# Patient Record
Sex: FEMALE | Race: WHITE | NOT HISPANIC OR LATINO | Employment: FULL TIME | URBAN - METROPOLITAN AREA
[De-identification: names, ages, dates, MRNs, and addresses within clinical notes are randomized per-mention and may not be internally consistent; named-entity substitution may affect disease eponyms.]

---

## 2018-05-02 ENCOUNTER — OFFICE VISIT (OUTPATIENT)
Dept: INTERNAL MEDICINE CLINIC | Facility: CLINIC | Age: 33
End: 2018-05-02
Payer: COMMERCIAL

## 2018-05-02 VITALS
HEART RATE: 92 BPM | OXYGEN SATURATION: 98 % | BODY MASS INDEX: 18.37 KG/M2 | HEIGHT: 64 IN | WEIGHT: 107.6 LBS | TEMPERATURE: 98.5 F | SYSTOLIC BLOOD PRESSURE: 104 MMHG | DIASTOLIC BLOOD PRESSURE: 74 MMHG

## 2018-05-02 DIAGNOSIS — F41.9 ANXIETY: ICD-10-CM

## 2018-05-02 DIAGNOSIS — M54.2 NECK PAIN: ICD-10-CM

## 2018-05-02 DIAGNOSIS — G89.29 CHRONIC RIGHT-SIDED LOW BACK PAIN WITH RIGHT-SIDED SCIATICA: ICD-10-CM

## 2018-05-02 DIAGNOSIS — G47.09 OTHER INSOMNIA: Primary | ICD-10-CM

## 2018-05-02 DIAGNOSIS — N91.2 AMENORRHEA: ICD-10-CM

## 2018-05-02 DIAGNOSIS — M54.41 CHRONIC RIGHT-SIDED LOW BACK PAIN WITH RIGHT-SIDED SCIATICA: ICD-10-CM

## 2018-05-02 DIAGNOSIS — J30.1 SEASONAL ALLERGIC RHINITIS DUE TO POLLEN: ICD-10-CM

## 2018-05-02 DIAGNOSIS — Z72.0 TOBACCO ABUSE: ICD-10-CM

## 2018-05-02 PROBLEM — G43.009 MIGRAINE WITHOUT AURA AND WITHOUT STATUS MIGRAINOSUS, NOT INTRACTABLE: Status: ACTIVE | Noted: 2018-05-02

## 2018-05-02 PROBLEM — J30.9 ALLERGIC RHINITIS: Status: ACTIVE | Noted: 2017-07-10

## 2018-05-02 PROCEDURE — 3725F SCREEN DEPRESSION PERFORMED: CPT | Performed by: INTERNAL MEDICINE

## 2018-05-02 PROCEDURE — 99203 OFFICE O/P NEW LOW 30 MIN: CPT | Performed by: INTERNAL MEDICINE

## 2018-05-02 RX ORDER — BUSPIRONE HYDROCHLORIDE 15 MG/1
15 TABLET ORAL
COMMUNITY
Start: 2017-07-31 | End: 2018-08-10 | Stop reason: ALTCHOICE

## 2018-05-02 RX ORDER — GABAPENTIN 600 MG/1
600 TABLET ORAL 2 TIMES DAILY
Refills: 0 | COMMUNITY
Start: 2018-04-08 | End: 2018-11-13

## 2018-05-02 RX ORDER — HYDROXYZINE HYDROCHLORIDE 25 MG/1
TABLET, FILM COATED ORAL
COMMUNITY
Start: 2017-12-28 | End: 2018-08-07 | Stop reason: ALTCHOICE

## 2018-05-02 RX ORDER — MIRTAZAPINE 15 MG/1
15 TABLET, FILM COATED ORAL
COMMUNITY
Start: 2018-01-13 | End: 2018-08-07 | Stop reason: ALTCHOICE

## 2018-05-02 RX ORDER — CLONAZEPAM 1 MG/1
TABLET ORAL
Qty: 90 TABLET | Refills: 0 | Status: SHIPPED | OUTPATIENT
Start: 2018-05-02 | End: 2018-06-07 | Stop reason: SDUPTHER

## 2018-05-02 RX ORDER — FLUTICASONE PROPIONATE 50 MCG
2 SPRAY, SUSPENSION (ML) NASAL
COMMUNITY
Start: 2017-07-10 | End: 2018-08-07 | Stop reason: SDUPTHER

## 2018-05-02 RX ORDER — DULOXETIN HYDROCHLORIDE 30 MG/1
60 CAPSULE, DELAYED RELEASE ORAL DAILY
COMMUNITY
Start: 2017-12-28 | End: 2018-08-10 | Stop reason: ALTCHOICE

## 2018-05-02 RX ORDER — GABAPENTIN 300 MG/1
CAPSULE ORAL
Refills: 0 | COMMUNITY
Start: 2018-02-14 | End: 2018-08-07 | Stop reason: ALTCHOICE

## 2018-05-02 RX ORDER — MELOXICAM 15 MG/1
15 TABLET ORAL DAILY
COMMUNITY
End: 2018-05-02 | Stop reason: ALTCHOICE

## 2018-05-02 RX ORDER — CLONAZEPAM 1 MG/1
TABLET ORAL
COMMUNITY
Start: 2018-03-23 | End: 2018-05-02 | Stop reason: SDUPTHER

## 2018-05-02 NOTE — ASSESSMENT & PLAN NOTE
For now, continue with Klonopin and Atarax  Recommend taking 3 did 9 mg of melatonin nightly as needed for insomnia  Counseled patient on sleep hygiene  Follow-up in 3 months

## 2018-05-02 NOTE — ASSESSMENT & PLAN NOTE
She has tried Wellbutrin in the past with side effects and I do not think Chantix would be a good candidate considering her severe anxiety  She has tried nicotine replacement without success  She currently smokes 10 cigarettes a day  Will refer to tobacco cessation counseling

## 2018-05-02 NOTE — ASSESSMENT & PLAN NOTE
Continue with gabapentin 900 mg t I d   Will refer to Spine and Pain Management  She has completed x-ray of her lumbar spine in 2017 which showed no acute findings as well as completed physical therapy in 2017 without improvement of symptoms  Will order MRI lumbar spine

## 2018-05-02 NOTE — ASSESSMENT & PLAN NOTE
Continue with current regimen of BuSpar 50 mg daily, duloxetine 60 mg daily, Atarax 25 mg every 6 hr as needed for anxiety, mirtazapine 15 mg daily, and as needed Klonopin 1 mg in the morning and 2 mg before bedtime  Will refer to psychiatry for further evaluation and treatment
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
## 2018-05-18 DIAGNOSIS — G47.09 OTHER INSOMNIA: ICD-10-CM

## 2018-05-18 DIAGNOSIS — F41.9 ANXIETY: ICD-10-CM

## 2018-05-18 RX ORDER — CLONAZEPAM 1 MG/1
TABLET ORAL
Qty: 90 TABLET | Refills: 0 | Status: CANCELLED | OUTPATIENT
Start: 2018-05-18

## 2018-05-18 NOTE — TELEPHONE ENCOUNTER
From: Warren Hernandez  Sent: 5/18/2018 2:26 AM EDT  Subject: Medication Renewal Request    Warren Rosason would like a refill of the following medications:     clonazePAM (KlonoPIN) 1 mg tablet Otilia Sever, MD]   Patient Comment: There wasn't a refill on the bottle but I don't see you again until late July  My pharmacy is the [address] [place] [address] on [facility]  Thank You      Preferred pharmacy: [facility]/PHARMACY #[id number]

## 2018-05-18 NOTE — TELEPHONE ENCOUNTER
Pt's med was just filled on 5/11/18 for a 30 day supply  Med will be filled again when refill is due  Pt will be notified

## 2018-05-21 ENCOUNTER — TELEPHONE (OUTPATIENT)
Dept: INTERNAL MEDICINE CLINIC | Facility: CLINIC | Age: 33
End: 2018-05-21

## 2018-06-07 DIAGNOSIS — F41.9 ANXIETY: ICD-10-CM

## 2018-06-07 DIAGNOSIS — G47.09 OTHER INSOMNIA: ICD-10-CM

## 2018-06-07 RX ORDER — CLONAZEPAM 1 MG/1
TABLET ORAL
Qty: 90 TABLET | Refills: 0 | Status: SHIPPED | OUTPATIENT
Start: 2018-06-07 | End: 2018-07-06 | Stop reason: SDUPTHER

## 2018-06-07 NOTE — TELEPHONE ENCOUNTER
From: Warren Hernandez  Sent: 6/7/2018 7:33 AM EDT  Subject: Medication Renewal Request    Bob David would like a refill of the following medications:     clonazePAM (KlonoPIN) 1 mg tablet Otilia Sever, MD]    Preferred pharmacy: [facility]  : [id number]    Comment:  My pharmacy is [facility] on [address]  Phone number is [phone]  I have never been to [facility] and I can't change my pharmacy settings on this website  I just want to make sure my medication goes to the correct pharmacy  Thank You!

## 2018-07-05 DIAGNOSIS — F41.9 ANXIETY: ICD-10-CM

## 2018-07-05 DIAGNOSIS — G47.09 OTHER INSOMNIA: ICD-10-CM

## 2018-07-06 DIAGNOSIS — G47.09 OTHER INSOMNIA: ICD-10-CM

## 2018-07-06 DIAGNOSIS — F41.9 ANXIETY: ICD-10-CM

## 2018-07-06 RX ORDER — CLONAZEPAM 1 MG/1
TABLET ORAL
Qty: 90 TABLET | Refills: 0 | Status: SHIPPED | OUTPATIENT
Start: 2018-07-06 | End: 2018-08-07 | Stop reason: SDUPTHER

## 2018-07-06 RX ORDER — CLONAZEPAM 1 MG/1
TABLET ORAL
Qty: 90 TABLET | Refills: 0 | Status: CANCELLED | OUTPATIENT
Start: 2018-07-06

## 2018-07-06 NOTE — TELEPHONE ENCOUNTER
Pt had called she needs clonazepam refilled please send to [facility] in [place]  She did send an email through the chart

## 2018-07-06 NOTE — TELEPHONE ENCOUNTER
From: Stefan Benites  Sent: 7/5/2018 8:46 PM EDT  Subject: Medication Renewal Request    Stefan Benites would like a refill of the following medications:     clonazePAM (KlonoPIN) 1 mg tablet Eula Roblero MD]    Preferred pharmacy: [facility]  : [id number]    Comment:

## 2018-07-09 RX ORDER — CLONAZEPAM 1 MG/1
TABLET ORAL
Qty: 90 TABLET | Refills: 0 | OUTPATIENT
Start: 2018-07-09

## 2018-08-04 DIAGNOSIS — F41.9 ANXIETY: ICD-10-CM

## 2018-08-04 DIAGNOSIS — G47.09 OTHER INSOMNIA: ICD-10-CM

## 2018-08-04 RX ORDER — CLONAZEPAM 1 MG/1
TABLET ORAL
Qty: 90 TABLET | Refills: 0 | Status: CANCELLED | OUTPATIENT
Start: 2018-08-04

## 2018-08-06 DIAGNOSIS — G47.09 OTHER INSOMNIA: ICD-10-CM

## 2018-08-06 DIAGNOSIS — F41.9 ANXIETY: ICD-10-CM

## 2018-08-06 RX ORDER — CLONAZEPAM 1 MG/1
TABLET ORAL
Qty: 90 TABLET | Refills: 0 | Status: CANCELLED | OUTPATIENT
Start: 2018-08-06

## 2018-08-06 NOTE — TELEPHONE ENCOUNTER
From: Salima Matamoros  Sent: 8/6/2018 11:09 AM EDT  Subject: Medication Renewal Request    Salima Matamoros would like a refill of the following medications:     clonazePAM (KlonoPIN) 1 mg tablet [JASON Jara]    Preferred pharmacy: [facility]  : [id number]    Comment:

## 2018-08-06 NOTE — TELEPHONE ENCOUNTER
Last O/V: 5/2/18  Next O/V:     PDMP checked ok to fill     Please call pt needs follow up becca prior to filling medication   Had appt 7/31/18 but canceled and did not rescheduled

## 2018-08-07 ENCOUNTER — OFFICE VISIT (OUTPATIENT)
Dept: INTERNAL MEDICINE CLINIC | Facility: CLINIC | Age: 33
End: 2018-08-07
Payer: COMMERCIAL

## 2018-08-07 VITALS
TEMPERATURE: 98.3 F | DIASTOLIC BLOOD PRESSURE: 70 MMHG | BODY MASS INDEX: 18.37 KG/M2 | WEIGHT: 107.6 LBS | OXYGEN SATURATION: 95 % | HEIGHT: 64 IN | HEART RATE: 73 BPM | SYSTOLIC BLOOD PRESSURE: 120 MMHG

## 2018-08-07 DIAGNOSIS — F41.9 ANXIETY: ICD-10-CM

## 2018-08-07 DIAGNOSIS — Z72.0 TOBACCO ABUSE: ICD-10-CM

## 2018-08-07 DIAGNOSIS — M54.41 CHRONIC RIGHT-SIDED LOW BACK PAIN WITH RIGHT-SIDED SCIATICA: ICD-10-CM

## 2018-08-07 DIAGNOSIS — J30.1 SEASONAL ALLERGIC RHINITIS DUE TO POLLEN: Primary | ICD-10-CM

## 2018-08-07 DIAGNOSIS — G47.09 OTHER INSOMNIA: ICD-10-CM

## 2018-08-07 DIAGNOSIS — G89.29 CHRONIC RIGHT-SIDED LOW BACK PAIN WITH RIGHT-SIDED SCIATICA: ICD-10-CM

## 2018-08-07 PROCEDURE — 99214 OFFICE O/P EST MOD 30 MIN: CPT | Performed by: NURSE PRACTITIONER

## 2018-08-07 RX ORDER — FLUTICASONE PROPIONATE 50 MCG
2 SPRAY, SUSPENSION (ML) NASAL DAILY
Qty: 16 G | Refills: 0 | Status: SHIPPED | OUTPATIENT
Start: 2018-08-07 | End: 2021-08-10 | Stop reason: ALTCHOICE

## 2018-08-07 RX ORDER — CYCLOBENZAPRINE HYDROCHLORIDE 15 MG/1
15 CAPSULE, EXTENDED RELEASE ORAL 2 TIMES DAILY
COMMUNITY
End: 2018-08-10 | Stop reason: ALTCHOICE

## 2018-08-07 RX ORDER — CLONAZEPAM 1 MG/1
TABLET ORAL
Qty: 90 TABLET | Refills: 0 | Status: SHIPPED | OUTPATIENT
Start: 2018-08-07 | End: 2018-09-05 | Stop reason: SDUPTHER

## 2018-08-07 NOTE — ASSESSMENT & PLAN NOTE
Patient currently states that she has cut down on her smoking, patient does have a referral to tobacco sensation however she was yet to reach out for tobacco cessation counseling

## 2018-08-07 NOTE — ASSESSMENT & PLAN NOTE
Patient is to continue to take Klonopin at night, and advised to take up to 10 milligrams  Of melatonin at night for insomnia  Counseled the patient on sleep hygiene  We will follow up with patient in about 3 months

## 2018-08-07 NOTE — ASSESSMENT & PLAN NOTE
Patient continues to take gabapentin 600 milligrams twice daily  Patient was referred to Spine and Pain Management in the past however she has yet to go to Spine and Pain Management  Patient had completed an x-ray of her lumbar spine in 2017 which had showed no acute findings as well as completed physical therapy in 2017 with no improvement of symptoms and MRI of her lumbar spine was previously ordered, however patient has yet to get the MRI

## 2018-08-07 NOTE — PROGRESS NOTES
Assessment/Plan:    Allergic rhinitis   Will send script for Flonase to use 2 sprays each nostril daily  Will also advised patient to get over-the-counter antihistamine such as Zyrtec to help with her postnasal drip which is probably the cause of her nausea  Anxiety    Patient is to continue with her current regimen of BuSpar 50 milligrams daily, Cymbalta 60 milligrams,  And Klonopin 1 milligram in the morning and 2 milligrams before bedtime  Patient was referred to Psychology for further evaluation treatment however has yet to go as she has not had insurance  I advised her the importance of seeing psychology  Patient is agreeable and will be reaching out to a psychologist to schedule  Advised patient to get bloodwork done prior to her next appointment  Right-sided low back pain with sciatica    Patient continues to take gabapentin 600 milligrams twice daily  Patient was referred to Spine and Pain Management in the past however she has yet to go to Spine and Pain Management  Patient had completed an x-ray of her lumbar spine in 2017 which had showed no acute findings as well as completed physical therapy in 2017 with no improvement of symptoms and MRI of her lumbar spine was previously ordered, however patient has yet to get the MRI  Tobacco abuse    Patient currently states that she has cut down on her smoking, patient does have a referral to tobacco sensation however she was yet to reach out for tobacco cessation counseling  Other insomnia    Patient is to continue to take Klonopin at night, and advised to take up to 10 milligrams  Of melatonin at night for insomnia  Counseled the patient on sleep hygiene  We will follow up with patient in about 3 months  Diagnoses and all orders for this visit:    Seasonal allergic rhinitis due to pollen  -     fluticasone (FLONASE) 50 mcg/act nasal spray; 2 sprays into each nostril daily    Other insomnia  -     clonazePAM (KlonoPIN) 1 mg tablet;  Take 1 tablet in the morning and 2 tablets at night before bedtime    Anxiety  -     clonazePAM (KlonoPIN) 1 mg tablet; Take 1 tablet in the morning and 2 tablets at night before bedtime    Chronic right-sided low back pain with right-sided sciatica    Tobacco abuse    Other orders  -     cyclobenzaprine (AMRIX) 15 MG 24 hr capsule; Take 15 mg by mouth 2 (two) times a day          Subjective:      Patient ID: Domitila Brown is a 34 y o  female  Patient presents today to follow-up on Anxiety and Insomnia  Patient has not seeing Psychology in the past however she was given a referral to Psychology  Patient still reports insomnia she takes 2 milligrams of clonazepam at night, however she has not yet tried melatonin  Patient just started a new job as an  at [employer]  Patient still smokes cigarettes and has for the plans of quitting  GYN- has been a while (Referral given at last appointment)      Anxiety   Presents for follow-up visit  Symptoms include insomnia and nausea  Patient reports no chest pain, depressed mood, dizziness, excessive worry, irritability, nervous/anxious behavior, palpitations, shortness of breath or suicidal ideas  Symptoms occur most days (but controlled much better)  The severity of symptoms is mild  The quality of sleep is poor  Nighttime awakenings: several            The following portions of the patient's history were reviewed and updated as appropriate: allergies, current medications, past family history, past medical history, past social history, past surgical history and problem list     Review of Systems   Constitutional: Negative for activity change, appetite change, chills, diaphoresis, fever and irritability  HENT: Positive for postnasal drip  Negative for congestion, ear discharge, ear pain, rhinorrhea, sinus pain, sinus pressure and sore throat  Eyes: Negative for pain, discharge, itching and visual disturbance     Respiratory: Negative for cough, chest tightness, shortness of breath and wheezing  Cardiovascular: Negative for chest pain, palpitations and leg swelling  Gastrointestinal: Positive for nausea  Negative for abdominal pain, blood in stool, constipation, diarrhea and vomiting  Endocrine: Negative for polydipsia, polyphagia and polyuria  Genitourinary: Negative for difficulty urinating, dysuria, hematuria and urgency  Musculoskeletal: Negative for arthralgias, back pain and neck pain  Skin: Negative for rash and wound  Allergic/Immunologic: Positive for environmental allergies  Neurological: Negative for dizziness, weakness, numbness and headaches  Psychiatric/Behavioral: Negative for suicidal ideas  The patient has insomnia  The patient is not nervous/anxious            Past Medical History:   Diagnosis Date    Anxiety     Migraine     Mood swings (HCC)     Urinary incontinence          Current Outpatient Prescriptions:     clonazePAM (KlonoPIN) 1 mg tablet, Take 1 tablet in the morning and 2 tablets at night before bedtime, Disp: 90 tablet, Rfl: 0    cyclobenzaprine (AMRIX) 15 MG 24 hr capsule, Take 15 mg by mouth 2 (two) times a day, Disp: , Rfl:     DULoxetine (CYMBALTA) 30 mg delayed release capsule, Take 60 mg by mouth daily  , Disp: , Rfl:     gabapentin (NEURONTIN) 600 MG tablet, Take 600 mg by mouth 2 (two) times a day  , Disp: , Rfl: 0    busPIRone (BUSPAR) 15 mg tablet, Take 15 mg by mouth, Disp: , Rfl:     fluticasone (FLONASE) 50 mcg/act nasal spray, 2 sprays into each nostril daily, Disp: 16 g, Rfl: 0    Allergies   Allergen Reactions    Other      seasonal       Social History   Past Surgical History:   Procedure Laterality Date    CHOLECYSTECTOMY      KNEE SURGERY       Family History   Problem Relation Age of Onset    Cancer Mother     Cancer Maternal Grandmother     Cancer Paternal Grandfather        Objective:  /70 (BP Location: Left arm, Patient Position: Sitting, Cuff Size: Adult)   Pulse 73   Temp 98 3 °F (36 8 °C) (Oral)   Ht 5' 4" (1 626 m)   Wt 48 8 kg (107 lb 9 6 oz)   SpO2 95% Comment: room air  BMI 18 47 kg/m²     No results found for this or any previous visit (from the past 1344 hour(s))  Physical Exam   Constitutional: She is oriented to person, place, and time  She appears well-developed and well-nourished  No distress  Very thin, smells of smoke   HENT:   Head: Normocephalic and atraumatic  Right Ear: External ear normal    Left Ear: External ear normal    Nose: Nose normal    Mouth/Throat: Oropharynx is clear and moist  No oropharyngeal exudate  Eyes: Conjunctivae and EOM are normal  Pupils are equal, round, and reactive to light  Right eye exhibits no discharge  Left eye exhibits no discharge  Neck: Normal range of motion  Neck supple  No thyromegaly present  Cardiovascular: Normal rate, regular rhythm, normal heart sounds and intact distal pulses  Exam reveals no gallop and no friction rub  No murmur heard  Pulmonary/Chest: Effort normal and breath sounds normal  No stridor  No respiratory distress  She has no wheezes  She has no rales  Abdominal: Soft  Bowel sounds are normal  She exhibits no distension  There is no tenderness  Musculoskeletal: Normal range of motion  Lymphadenopathy:     She has no cervical adenopathy  Neurological: She is alert and oriented to person, place, and time  Skin: Skin is warm and dry  No rash noted  She is not diaphoretic  No erythema  Psychiatric: She has a normal mood and affect   Her behavior is normal  Judgment and thought content normal

## 2018-08-07 NOTE — ASSESSMENT & PLAN NOTE
Patient is to continue with her current regimen of BuSpar 50 milligrams daily, Cymbalta 60 milligrams,  And Klonopin 1 milligram in the morning and 2 milligrams before bedtime  Patient was referred to Psychology for further evaluation treatment however has yet to go as she has not had insurance  I advised her the importance of seeing psychology  Patient is agreeable and will be reaching out to a psychologist to schedule  Advised patient to get bloodwork done prior to her next appointment

## 2018-08-07 NOTE — ASSESSMENT & PLAN NOTE
Will send script for Flonase to use 2 sprays each nostril daily  Will also advised patient to get over-the-counter antihistamine such as Zyrtec to help with her postnasal drip which is probably the cause of her nausea

## 2018-08-10 ENCOUNTER — OFFICE VISIT (OUTPATIENT)
Dept: INTERNAL MEDICINE CLINIC | Facility: CLINIC | Age: 33
End: 2018-08-10
Payer: COMMERCIAL

## 2018-08-10 ENCOUNTER — TELEPHONE (OUTPATIENT)
Dept: INTERNAL MEDICINE CLINIC | Facility: CLINIC | Age: 33
End: 2018-08-10

## 2018-08-10 VITALS
WEIGHT: 108.2 LBS | BODY MASS INDEX: 18.47 KG/M2 | HEIGHT: 64 IN | TEMPERATURE: 98.6 F | DIASTOLIC BLOOD PRESSURE: 62 MMHG | HEART RATE: 103 BPM | OXYGEN SATURATION: 96 % | SYSTOLIC BLOOD PRESSURE: 110 MMHG

## 2018-08-10 DIAGNOSIS — G43.009 MIGRAINE WITHOUT AURA AND WITHOUT STATUS MIGRAINOSUS, NOT INTRACTABLE: ICD-10-CM

## 2018-08-10 DIAGNOSIS — Z72.0 TOBACCO ABUSE: ICD-10-CM

## 2018-08-10 DIAGNOSIS — N91.2 AMENORRHEA: ICD-10-CM

## 2018-08-10 DIAGNOSIS — R91.8 MULTIPLE LUNG NODULES ON CT: ICD-10-CM

## 2018-08-10 DIAGNOSIS — G44.89 OTHER HEADACHE SYNDROME: ICD-10-CM

## 2018-08-10 DIAGNOSIS — N92.6 MISSED MENSES: ICD-10-CM

## 2018-08-10 DIAGNOSIS — F41.9 ANXIETY: Primary | ICD-10-CM

## 2018-08-10 DIAGNOSIS — Z13.220 SCREENING, LIPID: ICD-10-CM

## 2018-08-10 PROCEDURE — 99214 OFFICE O/P EST MOD 30 MIN: CPT | Performed by: PHYSICIAN ASSISTANT

## 2018-08-10 RX ORDER — ESCITALOPRAM OXALATE 10 MG/1
10 TABLET ORAL DAILY
Qty: 30 TABLET | Refills: 0 | Status: SHIPPED | OUTPATIENT
Start: 2018-08-10 | End: 2018-09-07

## 2018-08-10 NOTE — ASSESSMENT & PLAN NOTE
Previous workup with her last PCP  Will check quantitative HCG prior to following with MRI of the brain and CT of the chest as discussed below    Advised her to schedule OBGYN follow-up for further workup due to the amenorrhea

## 2018-08-10 NOTE — TELEPHONE ENCOUNTER
Pt states she was in recently and discussed she has been getting headaches  She now seems to get what could be migraines  Her employer now is requesting a note stating that she does get migraines  Her vision was off, she was bumping into the wall when walking  And acting off  She said they think something else is going on so they need a note   Stating she gets headaches  Is this possible to do or does she need another appt       # [phone]

## 2018-08-10 NOTE — ASSESSMENT & PLAN NOTE
Likely multifactorial due to recent lack of sleep  See treatment plan as discussed below    Tylenol to be used as needed for pain
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
## 2018-08-10 NOTE — TELEPHONE ENCOUNTER
Pt was seen on 8/6 by Freddy Mcconnell  No reference to migraine or HA  Pt should be re-seen with vision changes, not acting herself, off balance  THANKS!

## 2018-08-10 NOTE — ASSESSMENT & PLAN NOTE
Previous CT of the chest noted showing multiple pulmonary nodules  Advised her to avoid smoking  Go for follow-up CT of the chest to further evaluate these nodules
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
## 2018-08-11 ENCOUNTER — TELEPHONE (OUTPATIENT)
Dept: INTERNAL MEDICINE CLINIC | Facility: CLINIC | Age: 33
End: 2018-08-11

## 2018-08-11 NOTE — TELEPHONE ENCOUNTER
Please contact patient  I saw patient yesterday in the office I printed her quant Hgb lab for her yesterday and referred to her getting her may labs that were ordered but realized she may not have the lab order anymore and that there were a few things I would like to add to the orders placed by Dr Christine Patel in May  These labs are to better assess her menstrual cycyle as well as her anxiety  I placed new orders today  Please please see that she gets lab order for tsh, fsh, LH, lipid, cbc, cmp   Make sure she is aware that she is to go for these along with the lab order she got from me yesterday ([facility])  These labs are to be done in the next few days  Please let me know if any questions        Thanks Dillon Tamez

## 2018-08-11 NOTE — PROGRESS NOTES
Addendum to 8 10 18 note  Patient had been given lab orders by Dr Kaela Langford at 5/2/18 visit but has not yet gone for them  I placed new orders for those labs as well as a few other to further workup her abnormal menstrual cycle  Lab orders were placed    Staff will contact patient to see that she goes for labs TSH, CMP, CBC, lipids, FSH/LH/Prolactin in addition to Hcg

## 2018-08-13 ENCOUNTER — TELEPHONE (OUTPATIENT)
Dept: INTERNAL MEDICINE CLINIC | Age: 33
End: 2018-08-13

## 2018-08-13 NOTE — TELEPHONE ENCOUNTER
Spoke to patient  Informed her to get labs done for both Nicola Pickering and Dr Moreno Mays  Labs in chart  Will be coming to [facility] office before work  Labs are fasting  Pt has two pending appts with Dr Moreno Mays  Told he to keep both and discuss need for second f/u appt with Dr Moreno Mays at time of first f/u appt    Pt verbally acknowledged understanding

## 2018-08-14 ENCOUNTER — TELEPHONE (OUTPATIENT)
Dept: INTERNAL MEDICINE CLINIC | Age: 33
End: 2018-08-14

## 2018-08-30 DIAGNOSIS — F41.9 ANXIETY: ICD-10-CM

## 2018-08-30 DIAGNOSIS — G47.09 OTHER INSOMNIA: ICD-10-CM

## 2018-08-30 RX ORDER — CLONAZEPAM 1 MG/1
TABLET ORAL
Qty: 90 TABLET | Refills: 0 | Status: CANCELLED | OUTPATIENT
Start: 2018-08-30

## 2018-08-30 NOTE — TELEPHONE ENCOUNTER
From: Gricelda Cheema  Sent: 8/30/2018 5:31 PM EDT  Subject: Medication Renewal Request    Gricelda Cheema would like a refill of the following medications:     clonazePAM (KlonoPIN) 1 mg tablet [JASON Christensen]    Preferred pharmacy: [facility]  : [id number]    Comment:

## 2018-08-30 NOTE — TELEPHONE ENCOUNTER
Per PDMP site medication is not due to be refilled until 8/5/18 the earliest      Pt rosa have to request her refill then  Sent message to pt

## 2018-09-03 DIAGNOSIS — F41.9 ANXIETY: ICD-10-CM

## 2018-09-03 DIAGNOSIS — G47.09 OTHER INSOMNIA: ICD-10-CM

## 2018-09-03 RX ORDER — CLONAZEPAM 1 MG/1
TABLET ORAL
Qty: 90 TABLET | Refills: 0 | Status: CANCELLED | OUTPATIENT
Start: 2018-09-03

## 2018-09-04 DIAGNOSIS — F41.9 ANXIETY: ICD-10-CM

## 2018-09-04 DIAGNOSIS — G47.09 OTHER INSOMNIA: ICD-10-CM

## 2018-09-04 NOTE — TELEPHONE ENCOUNTER
Patient is requesting her clonazepam 1mg tablets   90qty  Per last PDMP check can be submitted 9/5/18      Please submit 9/5/18 to [facility] as requested

## 2018-09-05 RX ORDER — CLONAZEPAM 1 MG/1
TABLET ORAL
Qty: 90 TABLET | Refills: 0 | Status: SHIPPED | OUTPATIENT
Start: 2018-09-05 | End: 2018-10-01 | Stop reason: SDUPTHER

## 2018-09-07 ENCOUNTER — OFFICE VISIT (OUTPATIENT)
Dept: INTERNAL MEDICINE CLINIC | Facility: CLINIC | Age: 33
End: 2018-09-07
Payer: COMMERCIAL

## 2018-09-07 VITALS
WEIGHT: 110.6 LBS | TEMPERATURE: 98.4 F | HEART RATE: 91 BPM | BODY MASS INDEX: 18.88 KG/M2 | DIASTOLIC BLOOD PRESSURE: 58 MMHG | OXYGEN SATURATION: 97 % | HEIGHT: 64 IN | SYSTOLIC BLOOD PRESSURE: 100 MMHG

## 2018-09-07 DIAGNOSIS — G89.29 CHRONIC RIGHT-SIDED LOW BACK PAIN WITH RIGHT-SIDED SCIATICA: ICD-10-CM

## 2018-09-07 DIAGNOSIS — F41.9 ANXIETY: ICD-10-CM

## 2018-09-07 DIAGNOSIS — M54.41 CHRONIC RIGHT-SIDED LOW BACK PAIN WITH RIGHT-SIDED SCIATICA: ICD-10-CM

## 2018-09-07 DIAGNOSIS — G47.09 OTHER INSOMNIA: Primary | ICD-10-CM

## 2018-09-07 PROCEDURE — 99214 OFFICE O/P EST MOD 30 MIN: CPT | Performed by: NURSE PRACTITIONER

## 2018-09-07 RX ORDER — BUSPIRONE HYDROCHLORIDE 10 MG/1
10 TABLET ORAL 2 TIMES DAILY
Qty: 30 TABLET | Refills: 2 | Status: SHIPPED | OUTPATIENT
Start: 2018-09-07 | End: 2019-03-26 | Stop reason: ALTCHOICE

## 2018-09-07 RX ORDER — CYCLOBENZAPRINE HYDROCHLORIDE 15 MG/1
15 CAPSULE, EXTENDED RELEASE ORAL 2 TIMES DAILY
Qty: 60 CAPSULE | Refills: 0 | Status: SHIPPED | OUTPATIENT
Start: 2018-09-07 | End: 2018-10-11 | Stop reason: SDUPTHER

## 2018-09-07 RX ORDER — CYCLOBENZAPRINE HYDROCHLORIDE 15 MG/1
15 CAPSULE, EXTENDED RELEASE ORAL 2 TIMES DAILY
COMMUNITY
End: 2018-09-07 | Stop reason: SDUPTHER

## 2018-09-07 NOTE — PROGRESS NOTES
Assessment/Plan:     Diagnoses and all orders for this visit:    Other insomnia  -     busPIRone (BUSPAR) 10 mg tablet; Take 1 tablet (10 mg total) by mouth 2 (two) times a day        -     Reviewed good sleep hygiene at length        -     Can continue clonazepam and recommended to use her melatonin    Anxiety        -    Given instructions to wean off of lexapro as this is worsening her insomnia and not improving her anxiety        -     After lexapro d/c'd start Buspar        -     Follow up 1 month        -     Reprinted referrals for psych    Chronic right-sided low back pain with right-sided sciatica  -     cyclobenzaprine (AMRIX) 15 MG 24 hr capsule; Take 1 capsule (15 mg total) by mouth 2 (two) times a day        -        Other orders  -     Discontinue: cyclobenzaprine (AMRIX) 15 MG 24 hr capsule; Take 15 mg by mouth 2 (two) times a day         Subjective:      Patient ID: Stefan Benites is a 34 y o  female  HPI    Patient is here today concerned for severe insomnia and anxiety  Insomnia  Patient reports insomnia ongoing for a few years  She states her insomnia got significantly worse about 2 months ago  She was seen in our office twice in the last month  She has been using 2 clonazepam 1mg tablets before bed at night and sometimes melatonin but not always  She states that she has been on the clonazepam for about anxiety for about the last 5-6 years  She started using it for sleep about 2 years ago  In the last month she has also tried sleeping on the couch which has not been successful  She has trouble both falling asleep and staying asleep  Around 11pm-12am she starts by turning the lights off, reads a book or something to calm her mind, she reads in bed, around 12 she tries to fall asleep  She lays there awake until about 2am  She usually only sleeps for about an hour at a time  It takes her at least 45 minutes to fall back to sleep  In a total night she gets about 3-4 hours of broken sleep   She gets out of bed around 7am  She is at work from 1 am- 5-8pm  She rides her bike to work and back to make herself tired  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  She feels her insomnia worse since starting lexapro  Anxiety  She feels that her anxiety is worsening  She recently stopped her Cymbalta and Buspar about 2 months ago because she was trying to get off as many meds as she could  At the time she did not feel that the medications were helping her  Since stopping these medications her anxiety has worsened  She was also started on lexapro 10mg daily about 1 month ago  Symptoms of anxiety include sweats, "brain is pounding," trouble concentrating, she feels frozen  She denies any SI and HI  Overall she thinks the anxiety is worsened and no change since starting the lexapro  The following portions of the patient's history were reviewed and updated as appropriate: allergies, current medications, past family history, past medical history, past social history, past surgical history and problem list     Review of Systems   Constitutional: Negative for chills and fever  Psychiatric/Behavioral: Positive for decreased concentration and sleep disturbance  Negative for self-injury and suicidal ideas  The patient is nervous/anxious            Past Medical History:   Diagnosis Date    Anxiety     Migraine     Mood swings (HCC)     Urinary incontinence          Current Outpatient Prescriptions:     clonazePAM (KlonoPIN) 1 mg tablet, Take 1 tablet in the morning and 2 tablets at night before bedtime, Disp: 90 tablet, Rfl: 0    cyclobenzaprine (AMRIX) 15 MG 24 hr capsule, Take 15 mg by mouth 2 (two) times a day, Disp: , Rfl:     escitalopram (LEXAPRO) 10 mg tablet, Take 1 tablet (10 mg total) by mouth daily, Disp: 30 tablet, Rfl: 0    fluticasone (FLONASE) 50 mcg/act nasal spray, 2 sprays into each nostril daily, Disp: 16 g, Rfl: 0    gabapentin (NEURONTIN) 600 MG tablet, Take 600 mg by mouth 2 (two) times a day  , Disp: , Rfl: 0    Allergies   Allergen Reactions    Other      seasonal       Social History   Past Surgical History:   Procedure Laterality Date    CHOLECYSTECTOMY      KNEE SURGERY       Family History   Problem Relation Age of Onset    Cancer Mother     Cancer Maternal Grandmother     Cancer Paternal Grandfather        Objective:  /58 (BP Location: Left arm, Patient Position: Sitting, Cuff Size: Adult)   Pulse 91   Temp 98 4 °F (36 9 °C) (Oral)   Ht 5' 4" (1 626 m)   Wt 50 2 kg (110 lb 9 6 oz)   SpO2 97%   BMI 18 98 kg/m²      Physical Exam   Constitutional: She is oriented to person, place, and time  She appears well-developed and well-nourished  No distress  Appears fatigued   Cardiovascular: Normal rate and regular rhythm  No murmur heard  Pulmonary/Chest: Effort normal and breath sounds normal  No respiratory distress  She has no wheezes  Neurological: She is alert and oriented to person, place, and time  Skin: Skin is warm and dry  She is not diaphoretic  Psychiatric: She has a normal mood and affect  Her behavior is normal    Vitals reviewed

## 2018-09-07 NOTE — PATIENT INSTRUCTIONS
Start taking 1/2 tablet of lexapro x 1 week, then discontinue  Then start buspar once a day x 5 days  Then increase to buspar twice a day  Work on better sleep routine as discussed  Use melatonin as needed  Can continue to use clonazepam for sleep   Follow up 1 month

## 2018-09-07 NOTE — LETTER
September 7, 2018     Patient: Danish Mckeon   YOB: 1985   Date of Visit: 9/7/2018       To Whom it May Concern:    Danish Mckeon is under my professional care  She was seen in my office on 9/7/2018  She may return to work on 9/10/2018  If you have any questions or concerns, please don't hesitate to call           Sincerely,          JASON Tovar        CC: No Recipients

## 2018-10-01 DIAGNOSIS — G47.09 OTHER INSOMNIA: ICD-10-CM

## 2018-10-01 DIAGNOSIS — F41.9 ANXIETY: ICD-10-CM

## 2018-10-02 NOTE — TELEPHONE ENCOUNTER
From: Reyes Villalobos  Sent: 10/1/2018 6:19 PM EDT  Subject: Medication Renewal Request    Reyes Villalobos would like a refill of the following medications:     clonazePAM (KlonoPIN) 1 mg tablet [JASON Perez]    Preferred pharmacy: [facility]  : [id number]    Comment:

## 2018-10-03 RX ORDER — CLONAZEPAM 1 MG/1
TABLET ORAL
Qty: 90 TABLET | Refills: 0 | Status: SHIPPED | OUTPATIENT
Start: 2018-10-03 | End: 2018-11-02 | Stop reason: SDUPTHER

## 2018-10-11 DIAGNOSIS — G89.29 CHRONIC RIGHT-SIDED LOW BACK PAIN WITH RIGHT-SIDED SCIATICA: ICD-10-CM

## 2018-10-11 DIAGNOSIS — M54.41 CHRONIC RIGHT-SIDED LOW BACK PAIN WITH RIGHT-SIDED SCIATICA: ICD-10-CM

## 2018-10-12 ENCOUNTER — TELEPHONE (OUTPATIENT)
Dept: INTERNAL MEDICINE CLINIC | Facility: CLINIC | Age: 33
End: 2018-10-12

## 2018-10-12 RX ORDER — CYCLOBENZAPRINE HYDROCHLORIDE 15 MG/1
15 CAPSULE, EXTENDED RELEASE ORAL 2 TIMES DAILY
Qty: 60 CAPSULE | Refills: 0 | Status: SHIPPED | OUTPATIENT
Start: 2018-10-12 | End: 2018-10-15

## 2018-10-12 NOTE — TELEPHONE ENCOUNTER
Pharmacy called indicating that Cyclobenzaprine is not covered by the pt's insurance and prior Garrel League is not even an option  Tizanidine is however covered  Please review and see if change is appropriate  Send new Rx to [facility]  Thank you!

## 2018-10-12 NOTE — TELEPHONE ENCOUNTER
From: Vinay Sheffield  Sent: 10/11/2018 5:52 PM EDT  Subject: Medication Renewal Request    Vinay Sheffield would like a refill of the following medications:     cyclobenzaprine (AMRIX) 15 MG 24 hr capsule JASON Steen]    Preferred pharmacy: [address] [place] [address] St  : [id number]    Comment:  I never received this medication the last time  It never went through

## 2018-10-12 NOTE — TELEPHONE ENCOUNTER
I dont see this medicaiton on the pts list when she saw dr Yuli Barragan in may, then the next time she saw our office in August it was on her med list  I cant figure out who started her on this medication, but again it has been on her med list for at least 3 months  If they are not covering it, is this a new change or has she never taken it? Please find out more information  I also see stacy referred her to spine center

## 2018-10-15 DIAGNOSIS — G89.29 CHRONIC RIGHT-SIDED LOW BACK PAIN WITH RIGHT-SIDED SCIATICA: Primary | ICD-10-CM

## 2018-10-15 DIAGNOSIS — M54.41 CHRONIC RIGHT-SIDED LOW BACK PAIN WITH RIGHT-SIDED SCIATICA: Primary | ICD-10-CM

## 2018-10-15 DIAGNOSIS — M62.838 MUSCLE SPASM: ICD-10-CM

## 2018-10-15 RX ORDER — TIZANIDINE HYDROCHLORIDE 2 MG/1
2 CAPSULE, GELATIN COATED ORAL 3 TIMES DAILY PRN
Qty: 30 CAPSULE | Refills: 0 | Status: SHIPPED | OUTPATIENT
Start: 2018-10-15 | End: 2018-11-13 | Stop reason: SDUPTHER

## 2018-10-15 NOTE — TELEPHONE ENCOUNTER
Please notify patient that I switched her muscle relaxer to Tizanidine 2mg TID-PRN for muscle spasms

## 2018-10-16 NOTE — TELEPHONE ENCOUNTER
Pharmacy called to notified that Capsules are not cover by the insurance and I gave a verbal ok to switch medication to tablets which is cover by insurance

## 2018-10-29 ENCOUNTER — TELEPHONE (OUTPATIENT)
Dept: INTERNAL MEDICINE CLINIC | Facility: CLINIC | Age: 33
End: 2018-10-29

## 2018-10-29 DIAGNOSIS — G47.09 OTHER INSOMNIA: ICD-10-CM

## 2018-10-29 DIAGNOSIS — F41.9 ANXIETY: ICD-10-CM

## 2018-10-29 RX ORDER — CLONAZEPAM 1 MG/1
TABLET ORAL
Qty: 90 TABLET | Refills: 0 | Status: CANCELLED | OUTPATIENT
Start: 2018-10-29

## 2018-10-29 NOTE — TELEPHONE ENCOUNTER
From: Coco Knowles  Sent: 10/29/2018 1:36 AM EDT  Subject: Medication Renewal Request    Coco Knowles would like a refill of the following medications:     clonazePAM (KlonoPIN) 1 mg tablet Jimmy Parnell MD]    Preferred pharmacy: [facility]  : [id number]    Comment:

## 2018-10-29 NOTE — TELEPHONE ENCOUNTER
Responded to pt and asked that she send in her refill request for Friday, 11/2/18  The medication was last filled at the pharmacy on 10/5/18 per the PDMP site  It's too early to refill her medication at this time

## 2018-10-31 NOTE — TELEPHONE ENCOUNTER
Spoke to patient and explained that I called [facility] and verified that she picked up medication 10/5 and she can fill medication on 11/2/18, which would be early  Pt verbally acknowledged understanding  Informed pt to call office back on 11/2/18 for her refill

## 2018-11-02 DIAGNOSIS — G47.09 OTHER INSOMNIA: ICD-10-CM

## 2018-11-02 DIAGNOSIS — F41.9 ANXIETY: ICD-10-CM

## 2018-11-02 RX ORDER — CLONAZEPAM 1 MG/1
TABLET ORAL
Qty: 90 TABLET | Refills: 0 | Status: SHIPPED | OUTPATIENT
Start: 2018-11-02 | End: 2018-11-29 | Stop reason: SDUPTHER

## 2018-11-06 ENCOUNTER — TELEPHONE (OUTPATIENT)
Dept: INTERNAL MEDICINE CLINIC | Facility: CLINIC | Age: 33
End: 2018-11-06

## 2018-11-06 NOTE — TELEPHONE ENCOUNTER
Called Mikayla Oliveira to see if she had her blood work that is active from May and Aug done for her appt on Thursday  No answer so I left a message to please return our call to let us know if she had it done and where

## 2018-11-06 NOTE — TELEPHONE ENCOUNTER
Message from pt left on Refill line that trazodone is not helping her     Please call pt to set up appointment with provider

## 2018-11-07 NOTE — TELEPHONE ENCOUNTER
She has an appointment with me on 11/08/2018, we will address any questions or concerns she has during that appointment  Thank you

## 2018-11-08 ENCOUNTER — TELEPHONE (OUTPATIENT)
Dept: INTERNAL MEDICINE CLINIC | Facility: CLINIC | Age: 33
End: 2018-11-08

## 2018-11-08 NOTE — TELEPHONE ENCOUNTER
Please contact her so that she may have her labs and if possible imaging studies completed by the time of her next appointment with me, which is the rescheduled appointment from today's cancellation  Thank you

## 2018-11-08 NOTE — TELEPHONE ENCOUNTER
Pt called and stated she just switched over insurances and did not have an opportunity to get labs or radiology done  Did you want her to reschedule her 430 PM with you today to next week or still have her come into the office today? Please advise  She does have a question  She states she has really bad "leg syndrome" she does not know if she has RLS and she cannot sleep and wants to know if there is something she can take  Please advise  Thanks   CB# [phone]

## 2018-11-11 DIAGNOSIS — M54.41 CHRONIC RIGHT-SIDED LOW BACK PAIN WITH RIGHT-SIDED SCIATICA: ICD-10-CM

## 2018-11-11 DIAGNOSIS — M62.838 MUSCLE SPASM: ICD-10-CM

## 2018-11-11 DIAGNOSIS — G89.29 CHRONIC RIGHT-SIDED LOW BACK PAIN WITH RIGHT-SIDED SCIATICA: ICD-10-CM

## 2018-11-11 RX ORDER — TIZANIDINE HYDROCHLORIDE 2 MG/1
2 CAPSULE, GELATIN COATED ORAL 3 TIMES DAILY PRN
Qty: 30 CAPSULE | Refills: 0 | Status: CANCELLED | OUTPATIENT
Start: 2018-11-11

## 2018-11-12 NOTE — TELEPHONE ENCOUNTER
Last OV 9/7/18  Next OV 11/13/18    Patient sent request for Tizanidine yesterday directly to Dr Marbella Izquierdo

## 2018-11-13 ENCOUNTER — PATIENT OUTREACH (OUTPATIENT)
Dept: INTERNAL MEDICINE CLINIC | Facility: CLINIC | Age: 33
End: 2018-11-13

## 2018-11-13 ENCOUNTER — OFFICE VISIT (OUTPATIENT)
Dept: INTERNAL MEDICINE CLINIC | Facility: CLINIC | Age: 33
End: 2018-11-13
Payer: COMMERCIAL

## 2018-11-13 VITALS
DIASTOLIC BLOOD PRESSURE: 80 MMHG | WEIGHT: 99.6 LBS | OXYGEN SATURATION: 97 % | SYSTOLIC BLOOD PRESSURE: 124 MMHG | BODY MASS INDEX: 17 KG/M2 | HEIGHT: 64 IN | HEART RATE: 90 BPM | TEMPERATURE: 98.9 F

## 2018-11-13 DIAGNOSIS — G89.29 CHRONIC RIGHT-SIDED LOW BACK PAIN WITH RIGHT-SIDED SCIATICA: ICD-10-CM

## 2018-11-13 DIAGNOSIS — M54.41 CHRONIC RIGHT-SIDED LOW BACK PAIN WITH RIGHT-SIDED SCIATICA: Primary | ICD-10-CM

## 2018-11-13 DIAGNOSIS — M62.838 MUSCLE SPASM: ICD-10-CM

## 2018-11-13 DIAGNOSIS — M54.41 CHRONIC RIGHT-SIDED LOW BACK PAIN WITH RIGHT-SIDED SCIATICA: ICD-10-CM

## 2018-11-13 DIAGNOSIS — G89.29 CHRONIC RIGHT-SIDED LOW BACK PAIN WITH RIGHT-SIDED SCIATICA: Primary | ICD-10-CM

## 2018-11-13 DIAGNOSIS — Z72.0 TOBACCO ABUSE: ICD-10-CM

## 2018-11-13 DIAGNOSIS — M25.562 CHRONIC PAIN OF LEFT KNEE: ICD-10-CM

## 2018-11-13 DIAGNOSIS — G89.29 CHRONIC PAIN OF LEFT KNEE: ICD-10-CM

## 2018-11-13 PROBLEM — D16.9 OSTEOCHONDROMA: Status: ACTIVE | Noted: 2018-11-13

## 2018-11-13 PROBLEM — G44.89 OTHER HEADACHE SYNDROME: Status: RESOLVED | Noted: 2018-08-10 | Resolved: 2018-11-13

## 2018-11-13 PROCEDURE — 99214 OFFICE O/P EST MOD 30 MIN: CPT | Performed by: INTERNAL MEDICINE

## 2018-11-13 PROCEDURE — 3008F BODY MASS INDEX DOCD: CPT | Performed by: INTERNAL MEDICINE

## 2018-11-13 RX ORDER — TIZANIDINE HYDROCHLORIDE 2 MG/1
2 CAPSULE, GELATIN COATED ORAL 3 TIMES DAILY PRN
Qty: 30 CAPSULE | Refills: 0 | Status: SHIPPED | OUTPATIENT
Start: 2018-11-13 | End: 2018-11-13

## 2018-11-13 RX ORDER — TRAMADOL HYDROCHLORIDE 50 MG/1
50 TABLET ORAL EVERY 6 HOURS PRN
Qty: 30 TABLET | Refills: 0 | Status: SHIPPED | OUTPATIENT
Start: 2018-11-13 | End: 2018-11-22 | Stop reason: SDUPTHER

## 2018-11-13 RX ORDER — TIZANIDINE 2 MG/1
2 TABLET ORAL EVERY 8 HOURS PRN
Qty: 30 TABLET | Refills: 0 | Status: SHIPPED | OUTPATIENT
Start: 2018-11-13 | End: 2018-11-22 | Stop reason: SDUPTHER

## 2018-11-13 NOTE — ASSESSMENT & PLAN NOTE
Will order x-ray of the left knee and refer to Orthopedic surgery  Continue with NSAIDs as needed for pain

## 2018-11-13 NOTE — PROGRESS NOTES
Outpatient Care Management Note: Called and left a message that Jenifer would be calling pt to discuss and provide information about disability to pt

## 2018-11-13 NOTE — PROGRESS NOTES
Assessment/Plan:    Right-sided low back pain with sciatica  Will refer to spine and pain for further evaluation  X-ray from 2017 of the lumbar spine was unremarkable  Will order MRI of lumbar spine further evaluation  She has claustrophobia, therefore will seek a facility that offers an open MRI  Recommend taking Klonopin prior to imaging  Continue with tizanidine and NSAIDs as needed for for mild to moderate pain, continue with gabapentin 900 mg b i d  However she reports no significant improvement in pain while on gabapentin  Will also prescribe tramadol 50 mg to be taken every 6 hr as needed for moderate to severe pain until she sees a spine and pain doctor  Will defer to referral to physical therapy at this time as she has undergone therapy in the past without improvement of symptoms  Chronic pain of left knee  Will order x-ray of the left knee and refer to Orthopedic surgery  Continue with NSAIDs as needed for pain  Diagnoses and all orders for this visit:    Chronic right-sided low back pain with right-sided sciatica  -     Ambulatory referral to Pain Management; Future  -     TiZANidine (ZANAFLEX) 2 MG capsule; Take 1 capsule (2 mg total) by mouth 3 (three) times a day as needed for muscle spasms  -     MRI lumbar spine wo contrast; Future  -     traMADol (ULTRAM) 50 mg tablet; Take 1 tablet (50 mg total) by mouth every 6 (six) hours as needed for moderate pain or severe pain    Chronic pain of left knee  -     XR knee 3 vw left non injury; Future  -     Ambulatory referral to Orthopedic Surgery; Future  -     Ambulatory referral to Pain Management; Future  -     traMADol (ULTRAM) 50 mg tablet; Take 1 tablet (50 mg total) by mouth every 6 (six) hours as needed for moderate pain or severe pain    Tobacco abuse  -     Ambulatory referral to Smoking Cessation Program; Future    Muscle spasm  -     TiZANidine (ZANAFLEX) 2 MG capsule;  Take 1 capsule (2 mg total) by mouth 3 (three) times a day as needed for muscle spasms  -     traMADol (ULTRAM) 50 mg tablet; Take 1 tablet (50 mg total) by mouth every 6 (six) hours as needed for moderate pain or severe pain          Subjective:      Patient ID: Shani Burroughs is a 34 y o  female  59-year-old female is seen today for follow-up  She is an active cigarette smoker, currently smoking 5 cigarettes a day and is interested in quitting  She has smoked for the past 15 years, 15 pack year history  She has never been on any medications to help with smoking cessation  She also is being evaluated for chronic right-sided low back pain and left knee pain  Regarding her back pain, she has undergone physical therapy in the past, chiropractic manipulations, massage therapy, and Tens unit therapy with minimal relief in pain  Massage therapy has helped most as she reports applied pressure helps relieve pain  She also reports intermittent episodes of radiculopathy of the right lower extremity pain  She was prescribed tizanidine in the past with minimal improvement of symptoms  She has had loss of appetite due to pain  Loss of approximately 8 lbs over the past year, unintentional    LMP was in 2015  No spotting since  She is currently not on any birth control or IUD  She was given an order for blood work to check hormone levels however due to recent move, she has not been able to complete blood work  Back Pain   This is a chronic problem  The current episode started more than 1 year ago  The problem occurs constantly  The problem is unchanged  The pain is present in the lumbar spine  The quality of the pain is described as burning, aching and shooting  The pain radiates to the right thigh  The pain is at a severity of 7/10  The pain is moderate  The symptoms are aggravated by bending, twisting, standing and position   Pertinent negatives include no abdominal pain, bladder incontinence, bowel incontinence, chest pain, dysuria, fever, headaches, leg pain, numbness, paresis, paresthesias, pelvic pain, perianal numbness, tingling, weakness or weight loss  She has tried muscle relaxant, chiropractic manipulation, NSAIDs and home exercises for the symptoms  The treatment provided mild relief  Knee Pain    The incident occurred more than 1 week ago (2005)  Incident location: s/p osteochondroma resection  The pain is present in the left knee  The quality of the pain is described as aching and cramping  The pain is at a severity of 7/10  The pain is moderate  The pain has been fluctuating since onset  Pertinent negatives include no inability to bear weight, loss of motion, loss of sensation, muscle weakness, numbness or tingling  She reports no foreign bodies present  The symptoms are aggravated by movement  She has tried NSAIDs (Knee brace) for the symptoms  The treatment provided mild relief  The following portions of the patient's history were reviewed and updated as appropriate: allergies, current medications, past family history, past medical history, past social history, past surgical history and problem list     Review of Systems   Constitutional: Negative for activity change, appetite change, chills, diaphoresis, fatigue, fever and weight loss  HENT: Negative for congestion, postnasal drip, rhinorrhea, sinus pain, sinus pressure, sneezing and sore throat  Eyes: Negative for visual disturbance  Respiratory: Negative for apnea, cough, choking, chest tightness, shortness of breath and wheezing  Cardiovascular: Negative for chest pain, palpitations and leg swelling  Gastrointestinal: Negative for abdominal distention, abdominal pain, anal bleeding, blood in stool, bowel incontinence, constipation, diarrhea, nausea and vomiting  Endocrine: Negative for cold intolerance and heat intolerance  Genitourinary: Negative for bladder incontinence, difficulty urinating, dysuria, hematuria and pelvic pain     Musculoskeletal: Positive for arthralgias (Left knee) and back pain  Skin: Negative  Neurological: Negative for dizziness, tingling, weakness, light-headedness, numbness, headaches and paresthesias  Hematological: Negative for adenopathy  Psychiatric/Behavioral: Negative for agitation, sleep disturbance and suicidal ideas  All other systems reviewed and are negative  Past Medical History:   Diagnosis Date    Anxiety     Migraine     Mood swings     Urinary incontinence          Current Outpatient Prescriptions:     busPIRone (BUSPAR) 10 mg tablet, Take 1 tablet (10 mg total) by mouth 2 (two) times a day, Disp: 30 tablet, Rfl: 2    clonazePAM (KlonoPIN) 1 mg tablet, Take 1 tablet in the morning and 2 tablets at night before bedtime, Disp: 90 tablet, Rfl: 0    fluticasone (FLONASE) 50 mcg/act nasal spray, 2 sprays into each nostril daily, Disp: 16 g, Rfl: 0    TiZANidine (ZANAFLEX) 2 MG capsule, Take 1 capsule (2 mg total) by mouth 3 (three) times a day as needed for muscle spasms, Disp: 30 capsule, Rfl: 0    traMADol (ULTRAM) 50 mg tablet, Take 1 tablet (50 mg total) by mouth every 6 (six) hours as needed for moderate pain or severe pain, Disp: 30 tablet, Rfl: 0    Allergies   Allergen Reactions    Other      seasonal       Social History   Past Surgical History:   Procedure Laterality Date    CHOLECYSTECTOMY      KNEE SURGERY       Family History   Problem Relation Age of Onset    Cancer Mother     Cancer Maternal Grandmother     Cancer Paternal Grandfather        Objective:  /80 (BP Location: Left arm, Patient Position: Sitting, Cuff Size: Adult)   Pulse 90   Temp 98 9 °F (37 2 °C) (Oral)   Ht 5' 4" (1 626 m)   Wt 45 2 kg (99 lb 9 6 oz)   SpO2 97%   BMI 17 10 kg/m²     No results found for this or any previous visit (from the past 1344 hour(s))  Physical Exam   Constitutional: She is oriented to person, place, and time  She appears well-developed and well-nourished  No distress     HENT:   Head: Normocephalic and atraumatic  Eyes: Pupils are equal, round, and reactive to light  Conjunctivae and EOM are normal  Right eye exhibits no discharge  Left eye exhibits no discharge  Neck: Normal range of motion  Neck supple  No JVD present  No thyromegaly present  Cardiovascular: Normal rate, regular rhythm, normal heart sounds and intact distal pulses  Exam reveals no gallop and no friction rub  No murmur heard  Pulmonary/Chest: Effort normal and breath sounds normal  No respiratory distress  She has no wheezes  She has no rales  She exhibits no tenderness  Abdominal: Soft  She exhibits no distension  There is no tenderness  Musculoskeletal: Normal range of motion  She exhibits no edema or deformity  Left knee: She exhibits no LCL laxity and no MCL laxity  Tenderness found  Lumbar back: She exhibits tenderness, bony tenderness, pain and spasm  She exhibits normal range of motion, no swelling, no edema, no deformity and no laceration  Back:         Legs:  Lymphadenopathy:     She has no cervical adenopathy  Neurological: She is alert and oriented to person, place, and time  No cranial nerve deficit  Coordination normal    Skin: Skin is warm and dry  No rash noted  She is not diaphoretic  No erythema  No pallor  Psychiatric: She has a normal mood and affect  Her behavior is normal  Judgment and thought content normal    Nursing note and vitals reviewed

## 2018-11-13 NOTE — TELEPHONE ENCOUNTER
Florencio Witt from [facility] on [address] call because Dr Milagros Zazueta prescribe TiZANidine ([employer]) 2 MG capsule but her insurance does not cover capsules only tablets and the traMADol (ULTRAM) 50 mg tablet needs a prior authorization   Florencio Witt can be reach at [phone]

## 2018-11-13 NOTE — ASSESSMENT & PLAN NOTE
Will refer to spine and pain for further evaluation  X-ray from 2017 of the lumbar spine was unremarkable  Will order MRI of lumbar spine further evaluation  She has claustrophobia, therefore will seek a facility that offers an open MRI  Recommend taking Klonopin prior to imaging  Continue with tizanidine and NSAIDs as needed for for mild to moderate pain, continue with gabapentin 900 mg b i d  However she reports no significant improvement in pain while on gabapentin  Will also prescribe tramadol 50 mg to be taken every 6 hr as needed for moderate to severe pain until she sees a spine and pain doctor  Will defer to referral to physical therapy at this time as she has undergone therapy in the past without improvement of symptoms

## 2018-11-14 ENCOUNTER — DOCUMENTATION (OUTPATIENT)
Dept: INTERNAL MEDICINE CLINIC | Facility: CLINIC | Age: 33
End: 2018-11-14

## 2018-11-15 NOTE — TELEPHONE ENCOUNTER
Tramadol was approved for 50mg every 6 hours    Approval will end 2/15/19    Left message for pt to make aware

## 2018-11-15 NOTE — TELEPHONE ENCOUNTER
New Rx was already sent to the pharmacy for tablets yesterday and Marly Dawkins is working on Tramadol prior auth

## 2018-11-19 ENCOUNTER — TELEPHONE (OUTPATIENT)
Dept: INTERNAL MEDICINE CLINIC | Facility: CLINIC | Age: 33
End: 2018-11-19

## 2018-11-19 NOTE — TELEPHONE ENCOUNTER
Pt called questioning the amount prescribed on her tramadol and tizanidine   The quantity for both medications that were sent is 30   This will end the pt well before 30 days     For tramadol would need 120  For tizanidine would need 90    Please advise on sending additional quantity to pharmacy

## 2018-11-21 ENCOUNTER — TELEPHONE (OUTPATIENT)
Dept: INTERNAL MEDICINE CLINIC | Facility: CLINIC | Age: 33
End: 2018-11-21

## 2018-11-21 NOTE — TELEPHONE ENCOUNTER
Pt called asking for a call back about questions she has, pt stated it was about patches to quit smoking  She also was told by our odchadice that they were otc but she wants to know about having them sent in so they can be covered by insurance   Please call her at [phone]

## 2018-11-21 NOTE — TELEPHONE ENCOUNTER
Tizanidine and Tramadol are only for a short duration to control her pain until she schedules an appointment with a spine and pain specialist   We will only refill these 2 medications if she has scheduled an appointment  Please contact her to confirm that she has either schedule appointment or to reiterate that she needs to schedule appointment with a spine and pain specialist   Thank you

## 2018-11-22 DIAGNOSIS — F17.210 SMOKING GREATER THAN 20 PACK YEARS: Primary | ICD-10-CM

## 2018-11-22 DIAGNOSIS — M25.562 CHRONIC PAIN OF LEFT KNEE: ICD-10-CM

## 2018-11-22 DIAGNOSIS — G89.29 CHRONIC RIGHT-SIDED LOW BACK PAIN WITH RIGHT-SIDED SCIATICA: ICD-10-CM

## 2018-11-22 DIAGNOSIS — G89.29 CHRONIC PAIN OF LEFT KNEE: ICD-10-CM

## 2018-11-22 DIAGNOSIS — Z72.0 TOBACCO ABUSE: ICD-10-CM

## 2018-11-22 DIAGNOSIS — M62.838 MUSCLE SPASM: ICD-10-CM

## 2018-11-22 DIAGNOSIS — M54.41 CHRONIC RIGHT-SIDED LOW BACK PAIN WITH RIGHT-SIDED SCIATICA: ICD-10-CM

## 2018-11-22 RX ORDER — TRAMADOL HYDROCHLORIDE 50 MG/1
50 TABLET ORAL EVERY 8 HOURS PRN
Qty: 90 TABLET | Refills: 0 | Status: SHIPPED | OUTPATIENT
Start: 2018-11-22 | End: 2018-12-20 | Stop reason: SDUPTHER

## 2018-11-22 RX ORDER — NICOTINE 21 MG/24HR
1 PATCH, TRANSDERMAL 24 HOURS TRANSDERMAL EVERY 24 HOURS
Qty: 28 PATCH | Refills: 0 | Status: SHIPPED | OUTPATIENT
Start: 2018-11-22 | End: 2019-11-01 | Stop reason: ALTCHOICE

## 2018-11-22 RX ORDER — TIZANIDINE 2 MG/1
2 TABLET ORAL EVERY 8 HOURS PRN
Qty: 90 TABLET | Refills: 0 | Status: SHIPPED | OUTPATIENT
Start: 2018-11-22 | End: 2018-12-20 | Stop reason: SDUPTHER

## 2018-11-22 NOTE — TELEPHONE ENCOUNTER
Made pt aware, is still asking if can have prescription to be covered under insurance if doesn't qualify

## 2018-11-29 DIAGNOSIS — F41.9 ANXIETY: ICD-10-CM

## 2018-11-29 DIAGNOSIS — G47.09 OTHER INSOMNIA: ICD-10-CM

## 2018-11-29 NOTE — TELEPHONE ENCOUNTER
From: Bret Wilks  Sent: 11/29/2018 2:29 PM EST  Subject: Medication Renewal Request    Bret Wilks would like a refill of the following medications:     clonazePAM (KlonoPIN) 1 mg tablet Jessica Kiran MD]    Preferred pharmacy: [facility]  : [id number]    Comment:

## 2018-11-29 NOTE — TELEPHONE ENCOUNTER
Last O/V: 11/13/18  Next O/V:  02/19/19    PMED checked, no red flags identified; safe to proceed with prescription refills      Last filled 11/2/18 can be filled again 11/30/18

## 2018-11-30 RX ORDER — CLONAZEPAM 1 MG/1
TABLET ORAL
Qty: 90 TABLET | Refills: 0 | Status: SHIPPED | OUTPATIENT
Start: 2018-11-30 | End: 2018-12-26 | Stop reason: SDUPTHER

## 2018-12-04 DIAGNOSIS — F41.9 ANXIETY: Primary | ICD-10-CM

## 2018-12-04 NOTE — PROGRESS NOTES
Ms Lelia Hines me requesting a referral to a behavioral health therapist  Order submitted  I did recommend she contact her insurance to find out which therapist is covered

## 2018-12-20 ENCOUNTER — PATIENT OUTREACH (OUTPATIENT)
Dept: CASE MANAGEMENT | Facility: OTHER | Age: 33
End: 2018-12-20

## 2018-12-20 DIAGNOSIS — M62.838 MUSCLE SPASM: ICD-10-CM

## 2018-12-20 DIAGNOSIS — G89.29 CHRONIC PAIN OF LEFT KNEE: ICD-10-CM

## 2018-12-20 DIAGNOSIS — G89.29 CHRONIC RIGHT-SIDED LOW BACK PAIN WITH RIGHT-SIDED SCIATICA: ICD-10-CM

## 2018-12-20 DIAGNOSIS — M54.41 CHRONIC RIGHT-SIDED LOW BACK PAIN WITH RIGHT-SIDED SCIATICA: ICD-10-CM

## 2018-12-20 DIAGNOSIS — M25.562 CHRONIC PAIN OF LEFT KNEE: ICD-10-CM

## 2018-12-20 RX ORDER — TIZANIDINE 2 MG/1
2 TABLET ORAL EVERY 8 HOURS PRN
Qty: 90 TABLET | Refills: 0 | Status: SHIPPED | OUTPATIENT
Start: 2018-12-20 | End: 2019-01-17 | Stop reason: SDUPTHER

## 2018-12-20 RX ORDER — TRAMADOL HYDROCHLORIDE 50 MG/1
50 TABLET ORAL EVERY 8 HOURS PRN
Qty: 90 TABLET | Refills: 0 | Status: SHIPPED | OUTPATIENT
Start: 2018-12-20 | End: 2019-02-01 | Stop reason: SDUPTHER

## 2018-12-20 NOTE — PROGRESS NOTES
As per referral from Demetrio Huston and Dr Yuni Hernandez  Social Work Care Manager provided patient with disability information about one month ago  Received a follow up phone call from the patient  Patient  Was denied 8-9 months ago  and had some additional questions about a different path to take in regards to getting her disability approved for short term  I provided her with the contact information for [employer] in [address]  provides representation to low income families who are in need of legal help, or advocacy for disability claims

## 2018-12-26 ENCOUNTER — TELEPHONE (OUTPATIENT)
Dept: INTERNAL MEDICINE CLINIC | Facility: CLINIC | Age: 33
End: 2018-12-26

## 2018-12-26 DIAGNOSIS — F41.9 ANXIETY: ICD-10-CM

## 2018-12-26 DIAGNOSIS — G47.09 OTHER INSOMNIA: ICD-10-CM

## 2018-12-26 NOTE — TELEPHONE ENCOUNTER
Pt called insurance changed to [facility] and she needs a new auth for her tramadol     Completed forms today and sent into insurance company waiting on prior auth

## 2018-12-27 RX ORDER — CLONAZEPAM 1 MG/1
TABLET ORAL
Qty: 90 TABLET | Refills: 0 | Status: SHIPPED | OUTPATIENT
Start: 2018-12-27 | End: 2019-01-25 | Stop reason: SDUPTHER

## 2018-12-27 NOTE — TELEPHONE ENCOUNTER
Last ov for insomnia and anxiety 9/7/18  Next ov 2/19/19    PDMP - last refill of Clonazepam #90 on 11/30/18

## 2019-01-02 NOTE — TELEPHONE ENCOUNTER
Pt called reports that received denial letter in mail that Tramadol was not approved due to not being properly monitored on medication     I believe is due to pt not having a urine drug screen completed  Pt said that will wait on coming for the testing as she was not to continue medication after february  She will wait for follow up appt for re-evaluation     Pt also wanted to inform that since she moved was given news insurance as [place] was not available in her are  Insurance is now [employer]     Pt reports that her pain management appt needed to be canceled due to the fact that they do not accept this insurance

## 2019-01-17 DIAGNOSIS — M62.838 MUSCLE SPASM: ICD-10-CM

## 2019-01-18 RX ORDER — TIZANIDINE 2 MG/1
2 TABLET ORAL EVERY 8 HOURS PRN
Qty: 90 TABLET | Refills: 1 | Status: SHIPPED | OUTPATIENT
Start: 2019-01-18 | End: 2019-03-14 | Stop reason: SDUPTHER

## 2019-01-18 NOTE — TELEPHONE ENCOUNTER
From: Elsie Amaro  Sent: 1/17/2019 3:30 PM EST  Subject: Medication Renewal Request    Elsie Amaro would like a refill of the following medications:     tiZANidine (ZANAFLEX) 2 mg tablet Christin Street MD]    Preferred pharmacy: [facility]  : [id number]    Comment:

## 2019-01-25 ENCOUNTER — TELEPHONE (OUTPATIENT)
Dept: INTERNAL MEDICINE CLINIC | Facility: CLINIC | Age: 34
End: 2019-01-25

## 2019-01-25 DIAGNOSIS — G47.09 OTHER INSOMNIA: ICD-10-CM

## 2019-01-25 DIAGNOSIS — F41.9 ANXIETY: ICD-10-CM

## 2019-01-25 RX ORDER — CLONAZEPAM 1 MG/1
TABLET ORAL
Qty: 90 TABLET | Refills: 0 | Status: SHIPPED | OUTPATIENT
Start: 2019-01-25 | End: 2019-02-22 | Stop reason: SDUPTHER

## 2019-01-25 NOTE — TELEPHONE ENCOUNTER
Per Diana Potts, this med needed a prior authorization  I called [facility] and spoke to the pharmacist and he told me that the Clonazepam went through the insurance just fine and the patient can  the medication whenever  He put it on hold in case the pt would want to  the med in [facility] as she has in the past     No Prior Auth needed per pharmacist

## 2019-01-25 NOTE — TELEPHONE ENCOUNTER
Please refill her clonazepam sent to the [facility] pharmacy on [address]     Thanks

## 2019-01-31 DIAGNOSIS — G89.29 CHRONIC RIGHT-SIDED LOW BACK PAIN WITH RIGHT-SIDED SCIATICA: ICD-10-CM

## 2019-01-31 DIAGNOSIS — M54.41 CHRONIC RIGHT-SIDED LOW BACK PAIN WITH RIGHT-SIDED SCIATICA: ICD-10-CM

## 2019-01-31 DIAGNOSIS — M25.562 CHRONIC PAIN OF LEFT KNEE: ICD-10-CM

## 2019-01-31 DIAGNOSIS — G89.29 CHRONIC PAIN OF LEFT KNEE: ICD-10-CM

## 2019-01-31 DIAGNOSIS — M62.838 MUSCLE SPASM: ICD-10-CM

## 2019-01-31 RX ORDER — TRAMADOL HYDROCHLORIDE 50 MG/1
50 TABLET ORAL EVERY 8 HOURS PRN
Qty: 90 TABLET | Refills: 0 | Status: CANCELLED | OUTPATIENT
Start: 2019-01-31

## 2019-02-01 ENCOUNTER — TELEPHONE (OUTPATIENT)
Dept: INTERNAL MEDICINE CLINIC | Facility: CLINIC | Age: 34
End: 2019-02-01

## 2019-02-01 DIAGNOSIS — G89.29 CHRONIC RIGHT-SIDED LOW BACK PAIN WITH RIGHT-SIDED SCIATICA: ICD-10-CM

## 2019-02-01 DIAGNOSIS — G89.29 CHRONIC PAIN OF LEFT KNEE: ICD-10-CM

## 2019-02-01 DIAGNOSIS — M25.562 CHRONIC PAIN OF LEFT KNEE: ICD-10-CM

## 2019-02-01 DIAGNOSIS — M62.838 MUSCLE SPASM: ICD-10-CM

## 2019-02-01 DIAGNOSIS — M54.41 CHRONIC RIGHT-SIDED LOW BACK PAIN WITH RIGHT-SIDED SCIATICA: ICD-10-CM

## 2019-02-01 RX ORDER — TRAMADOL HYDROCHLORIDE 50 MG/1
50 TABLET ORAL EVERY 8 HOURS PRN
Qty: 90 TABLET | Refills: 0 | Status: SHIPPED | OUTPATIENT
Start: 2019-02-01 | End: 2019-03-26 | Stop reason: ALTCHOICE

## 2019-02-01 NOTE — TELEPHONE ENCOUNTER
From: Nestor Romeo  Sent: 1/31/2019 4:48 PM EST  Subject: Medication Renewal Request    Nestor Romeo would like a refill of the following medications:     traMADol (ULTRAM) 50 mg tablet Hoang Hernandez MD]   Patient Comment: please send to [facility] in [facility] pharmacy: [address]  : [id number]

## 2019-02-01 NOTE — TELEPHONE ENCOUNTER
Prior Authorization form filled out and Faxed to [address] [place] [address] for medication Tramadol 2019  Jaylin's last Auth  on 2019 per insurance company

## 2019-02-04 NOTE — TELEPHONE ENCOUNTER
Denied due to not havign any drug screening in her chart    Will need to make nurse visit to have urine drug screening complete

## 2019-02-06 ENCOUNTER — TELEPHONE (OUTPATIENT)
Dept: INTERNAL MEDICINE CLINIC | Facility: CLINIC | Age: 34
End: 2019-02-06

## 2019-02-07 ENCOUNTER — TELEPHONE (OUTPATIENT)
Dept: INTERNAL MEDICINE CLINIC | Facility: CLINIC | Age: 34
End: 2019-02-07

## 2019-02-07 NOTE — TELEPHONE ENCOUNTER
Called patient and stated she needs to make a nurse visit to have UDS (urine Drug Screen) done for her prior auth for Tramadol

## 2019-02-13 DIAGNOSIS — M62.838 MUSCLE SPASM: ICD-10-CM

## 2019-02-13 RX ORDER — TIZANIDINE 2 MG/1
2 TABLET ORAL EVERY 8 HOURS PRN
Qty: 90 TABLET | Refills: 0 | Status: CANCELLED | OUTPATIENT
Start: 2019-02-13

## 2019-02-20 DIAGNOSIS — G47.09 OTHER INSOMNIA: ICD-10-CM

## 2019-02-20 DIAGNOSIS — F41.9 ANXIETY: ICD-10-CM

## 2019-02-20 RX ORDER — CLONAZEPAM 1 MG/1
TABLET ORAL
Qty: 90 TABLET | Refills: 0 | Status: CANCELLED | OUTPATIENT
Start: 2019-02-20

## 2019-02-22 DIAGNOSIS — F41.9 ANXIETY: ICD-10-CM

## 2019-02-22 DIAGNOSIS — G47.09 OTHER INSOMNIA: ICD-10-CM

## 2019-02-22 RX ORDER — CLONAZEPAM 1 MG/1
1 TABLET ORAL 3 TIMES DAILY
Qty: 20 TABLET | Refills: 0 | Status: SHIPPED | OUTPATIENT
Start: 2019-02-22 | End: 2019-02-26 | Stop reason: SDUPTHER

## 2019-02-22 NOTE — TELEPHONE ENCOUNTER
Last O/V: 11/13/2018  Next O/V: is looking for new PCP due to new insurance   ok for 7 day supply per Dr Vera Miranda will discuss with Dr Mayi Sawant to the rest of the amount on Monday   PMED checked, no red flags identified; safe to proceed with prescription refills

## 2019-02-25 DIAGNOSIS — G47.09 OTHER INSOMNIA: ICD-10-CM

## 2019-02-25 DIAGNOSIS — F41.9 ANXIETY: ICD-10-CM

## 2019-02-26 DIAGNOSIS — G47.09 OTHER INSOMNIA: ICD-10-CM

## 2019-02-26 DIAGNOSIS — F41.9 ANXIETY: ICD-10-CM

## 2019-02-26 RX ORDER — CLONAZEPAM 1 MG/1
1 TABLET ORAL 3 TIMES DAILY
Qty: 69 TABLET | Refills: 0 | Status: CANCELLED | OUTPATIENT
Start: 2019-02-26

## 2019-02-26 RX ORDER — CLONAZEPAM 1 MG/1
1 TABLET ORAL 3 TIMES DAILY
Qty: 69 TABLET | Refills: 0 | Status: SHIPPED | OUTPATIENT
Start: 2019-02-26 | End: 2019-03-19 | Stop reason: SDUPTHER

## 2019-03-05 DIAGNOSIS — G89.29 CHRONIC PAIN OF LEFT KNEE: ICD-10-CM

## 2019-03-05 DIAGNOSIS — M25.562 CHRONIC PAIN OF LEFT KNEE: ICD-10-CM

## 2019-03-05 DIAGNOSIS — M62.838 MUSCLE SPASM: ICD-10-CM

## 2019-03-05 DIAGNOSIS — G89.29 CHRONIC RIGHT-SIDED LOW BACK PAIN WITH RIGHT-SIDED SCIATICA: ICD-10-CM

## 2019-03-05 DIAGNOSIS — M54.41 CHRONIC RIGHT-SIDED LOW BACK PAIN WITH RIGHT-SIDED SCIATICA: ICD-10-CM

## 2019-03-06 RX ORDER — TRAMADOL HYDROCHLORIDE 50 MG/1
50 TABLET ORAL EVERY 8 HOURS PRN
Qty: 90 TABLET | Refills: 0 | Status: CANCELLED | OUTPATIENT
Start: 2019-03-06

## 2019-03-06 NOTE — TELEPHONE ENCOUNTER
Last O/V: 11/13/18  Next O/V:  Over due for follow up was to have follow up visit 2/13/19    Left message for pt to call office and make appt prior to medications being refilled

## 2019-03-14 DIAGNOSIS — M62.838 MUSCLE SPASM: ICD-10-CM

## 2019-03-14 RX ORDER — TIZANIDINE 2 MG/1
2 TABLET ORAL EVERY 8 HOURS PRN
Qty: 90 TABLET | Refills: 0 | Status: CANCELLED | OUTPATIENT
Start: 2019-03-14

## 2019-03-14 NOTE — TELEPHONE ENCOUNTER
LMOM to confirm if pt got her extra refill from previous rx  If not, we would need to cancel that rx and order a new one to go to [facility] in [facility] as per pt request

## 2019-03-15 RX ORDER — TIZANIDINE 2 MG/1
2 TABLET ORAL EVERY 8 HOURS PRN
Qty: 90 TABLET | Refills: 1 | Status: SHIPPED | OUTPATIENT
Start: 2019-03-15 | End: 2019-03-26 | Stop reason: ALTCHOICE

## 2019-03-19 DIAGNOSIS — G47.09 OTHER INSOMNIA: ICD-10-CM

## 2019-03-19 DIAGNOSIS — F41.9 ANXIETY: ICD-10-CM

## 2019-03-21 NOTE — TELEPHONE ENCOUNTER
Pt called left message that is unable to come to office due to new insurance ([employer])  can no longer come to our office  She has new PCP appt 3/26/19    Is asking to have medication filled to this date please advise?

## 2019-03-22 RX ORDER — CLONAZEPAM 1 MG/1
1 TABLET ORAL 3 TIMES DAILY
Qty: 15 TABLET | Refills: 0 | Status: SHIPPED | OUTPATIENT
Start: 2019-03-22 | End: 2019-03-26

## 2019-03-22 NOTE — TELEPHONE ENCOUNTER
[facility] office at [phone] no appt is made     Called pt and made aware   She will call office and find out why she doesn't have an o/v scheduled  and call back once an appt is made       Emanuel Burns is part of [facility] will be able to see in chart when made

## 2019-03-22 NOTE — TELEPHONE ENCOUNTER
Pt only asking for clonazepam at this time   Will need to request tramadol when establishes care with new PCP

## 2019-03-22 NOTE — TELEPHONE ENCOUNTER
Pt called back said that she does have appt for 3/26/19     appt was still not showing in chart called back to Darlene's office and found that there had been a duplicate chart created       Can now see that pt as new appt on 3/26/19 with Earlene Samuel

## 2019-03-26 ENCOUNTER — TELEPHONE (OUTPATIENT)
Dept: FAMILY MEDICINE CLINIC | Facility: CLINIC | Age: 34
End: 2019-03-26

## 2019-03-26 ENCOUNTER — OFFICE VISIT (OUTPATIENT)
Dept: FAMILY MEDICINE CLINIC | Facility: CLINIC | Age: 34
End: 2019-03-26
Payer: COMMERCIAL

## 2019-03-26 VITALS
WEIGHT: 118 LBS | SYSTOLIC BLOOD PRESSURE: 100 MMHG | HEIGHT: 64 IN | OXYGEN SATURATION: 98 % | BODY MASS INDEX: 20.14 KG/M2 | HEART RATE: 101 BPM | DIASTOLIC BLOOD PRESSURE: 72 MMHG

## 2019-03-26 DIAGNOSIS — G89.29 CHRONIC RIGHT-SIDED THORACIC BACK PAIN: Primary | ICD-10-CM

## 2019-03-26 DIAGNOSIS — F41.9 ANXIETY: ICD-10-CM

## 2019-03-26 DIAGNOSIS — G47.00 INSOMNIA, UNSPECIFIED TYPE: ICD-10-CM

## 2019-03-26 DIAGNOSIS — M54.6 CHRONIC RIGHT-SIDED THORACIC BACK PAIN: Primary | ICD-10-CM

## 2019-03-26 DIAGNOSIS — G47.00 INSOMNIA, UNSPECIFIED TYPE: Primary | ICD-10-CM

## 2019-03-26 PROCEDURE — 99204 OFFICE O/P NEW MOD 45 MIN: CPT | Performed by: FAMILY MEDICINE

## 2019-03-26 PROCEDURE — 3008F BODY MASS INDEX DOCD: CPT | Performed by: FAMILY MEDICINE

## 2019-03-26 RX ORDER — CLONAZEPAM 1 MG/1
1 TABLET ORAL 2 TIMES DAILY
Qty: 30 TABLET | Refills: 0 | Status: SHIPPED | OUTPATIENT
Start: 2019-03-26 | End: 2019-03-27 | Stop reason: SDUPTHER

## 2019-03-26 RX ORDER — MIRTAZAPINE 30 MG/1
30 TABLET, FILM COATED ORAL
Qty: 30 TABLET | Refills: 1 | Status: SHIPPED | OUTPATIENT
Start: 2019-03-26 | End: 2019-03-27

## 2019-03-26 RX ORDER — CLONAZEPAM 1 MG/1
1 TABLET, ORALLY DISINTEGRATING ORAL 2 TIMES DAILY
Qty: 60 TABLET | Refills: 0 | Status: SHIPPED | OUTPATIENT
Start: 2019-03-26 | End: 2019-03-26

## 2019-03-26 NOTE — ASSESSMENT & PLAN NOTE
Will cut back klonopin to 1 mg bid as this is not helping for sleep and will add mirtazapine nightly

## 2019-03-26 NOTE — PROGRESS NOTES
Assessment/Plan:     Chronic Problems:  Anxiety  Discussed with patient the judicious use of Klonopin  I will attempt to get her completely off this medication as this is not a medication I would use long term  Will start 30 mg mirtazapine at night and this month will cut the klonopin to 1 mg bid  Next month will cut klonopin to 1 mg daily  Following month off meds  Insomnia  Will cut back klonopin to 1 mg bid as this is not helping for sleep and will add mirtazapine nightly  Visit Diagnosis:  Diagnoses and all orders for this visit:    Chronic right-sided thoracic back pain  Comments:  Pt defers treatment at this time  We will get a copy of the mri and xrays from previous physician  Anxiety  -     mirtazapine (REMERON SOL-TAB) 30 mg dispersible tablet; Take 1 tablet (30 mg total) by mouth daily at bedtime  -     clonazePAM (KlonoPIN) 1 MG disintegrating tablet; Take 1 tablet (1 mg total) by mouth 2 (two) times a day  -     TSH, 3rd generation with Free T4 reflex; Future  -     Comprehensive metabolic panel; Future    Insomnia, unspecified type  -     mirtazapine (REMERON SOL-TAB) 30 mg dispersible tablet; Take 1 tablet (30 mg total) by mouth daily at bedtime  -     clonazePAM (KlonoPIN) 1 MG disintegrating tablet; Take 1 tablet (1 mg total) by mouth 2 (two) times a day  -     TSH, 3rd generation with Free T4 reflex; Future  -     Comprehensive metabolic panel; Future          Subjective:    Patient ID: Johnnie Garcai is a 30 y o  female  Pt is here to establish  Just moved back to this area and was following with Dr Maicol Silverio in [facility]  Pt has concerns about renewing her meds  Pt needs renewals on klonopin for anxiety and panic attacks for about 6 years  Has been on klonopin 1 mg am and 2 mg pm  Anxiety is still bad  Pt had been on 15 different meds in the past for anxiety  Had depression in the past but has been off those meds for a while  Pt states she was not aware that klonopin is addicting  Pt is also on tizanidine as a muscle relaxer for her back and also feels it is hurting more that it is helping  Was on tramadol for the pain, but not helping so she took herself off  Plans on quitting smoking  All other meds current  The following portions of the patient's history were reviewed and updated as appropriate: allergies, current medications, past family history, past medical history, past social history, past surgical history and problem list     Review of Systems   Constitutional: Negative for chills, diaphoresis, fatigue and fever  HENT: Negative for sinus pressure, sinus pain, sneezing and sore throat  Seen by dentist yesterday for dental pain  Can't have a tooth removed until the end of April  Eyes: Negative  Respiratory: Negative for cough, shortness of breath and wheezing  Cardiovascular: Negative for chest pain and palpitations  Gastrointestinal: Negative  Genitourinary: Negative for dysuria, frequency and urgency  FDLMP - March 24th  Last pap was 10 years ago  Musculoskeletal: Positive for back pain (had mri's and xrays several years ago and told wnl  Told her back problems were r/t her scoliosis)  Neurological: Negative for dizziness, light-headedness and headaches  Psychiatric/Behavioral: Positive for sleep disturbance (Klonopin does not help sleep  )  Negative for dysphoric mood  The patient is nervous/anxious            /72   Pulse 101   Ht 5' 4" (1 626 m)   Wt 53 5 kg (118 lb)   LMP 03/26/2019 (Exact Date)   SpO2 98%   BMI 20 25 kg/m²   Social History     Socioeconomic History    Marital status: Single     Spouse name: Not on file    Number of children: Not on file    Years of education: Not on file    Highest education level: Not on file   Occupational History    Not on file   Social Needs    Financial resource strain: Not on file    Food insecurity:     Worry: Not on file     Inability: Not on file    Transportation needs: Medical: Not on file     Non-medical: Not on file   Tobacco Use    Smoking status: Light Tobacco Smoker    Smokeless tobacco: Never Used    Tobacco comment: smoking for the past 10 years   Substance and Sexual Activity    Alcohol use: No    Drug use: No    Sexual activity: Not on file   Lifestyle    Physical activity:     Days per week: Not on file     Minutes per session: Not on file    Stress: Not on file   Relationships    Social connections:     Talks on phone: Not on file     Gets together: Not on file     Attends [employer] service: Not on file     Active member of club or organization: Not on file     Attends meetings of clubs or organizations: Not on file     Relationship status: Not on file    Intimate partner violence:     Fear of current or ex partner: Not on file     Emotionally abused: Not on file     Physically abused: Not on file     Forced sexual activity: Not on file   Other Topics Concern    Not on file   Social History Narrative    ** Merged History Encounter **          Past Medical History:   Diagnosis Date    Anxiety     Migraine     Mood swings     Urinary incontinence      Family History   Problem Relation Age of Onset    Cancer Mother     Cancer Maternal Grandmother     Cancer Paternal Grandfather      Past Surgical History:   Procedure Laterality Date    CHOLECYSTECTOMY      KNEE SURGERY         Current Outpatient Medications:     clonazePAM (KlonoPIN) 1 MG disintegrating tablet, Take 1 tablet (1 mg total) by mouth 2 (two) times a day, Disp: 60 tablet, Rfl: 0    fluticasone (FLONASE) 50 mcg/act nasal spray, 2 sprays into each nostril daily (Patient not taking: Reported on 3/26/2019), Disp: 16 g, Rfl: 0    mirtazapine (REMERON SOL-TAB) 30 mg dispersible tablet, Take 1 tablet (30 mg total) by mouth daily at bedtime, Disp: 30 tablet, Rfl: 1    nicotine (NICODERM CQ) 14 mg/24hr TD 24 hr patch, Place 1 patch on the skin every 24 hours (Patient not taking: Reported on 3/26/2019), Disp: 28 patch, Rfl: 0    Allergies   Allergen Reactions    Other      seasonal          Lab Review   No visits with results within 2 Month(s) from this visit  Latest known visit with results is:   No results found for any previous visit  Imaging: No results found  Objective:     Physical Exam   Constitutional: She is oriented to person, place, and time  She appears well-developed and well-nourished  No distress  HENT:   Head: Normocephalic and atraumatic  Right Ear: External ear normal    Left Ear: External ear normal    Mouth/Throat: Oropharynx is clear and moist    Eyes: Pupils are equal, round, and reactive to light  Conjunctivae and EOM are normal  Right eye exhibits no discharge  Left eye exhibits no discharge  Neck: Normal range of motion  Neck supple  No thyromegaly present  Cardiovascular: Normal rate, regular rhythm and normal heart sounds  Exam reveals no friction rub  No murmur heard  Pulmonary/Chest: Effort normal and breath sounds normal  No respiratory distress  She has no wheezes  She has no rales  She exhibits no tenderness  Abdominal: Soft  Bowel sounds are normal  There is no tenderness  Musculoskeletal: Normal range of motion  She exhibits no edema, tenderness or deformity  No scoliosis noted  Lymphadenopathy:     She has no cervical adenopathy  Neurological: She is alert and oriented to person, place, and time  No cranial nerve deficit  Skin: Skin is warm and dry  No rash noted  She is not diaphoretic  Psychiatric: She has a normal mood and affect  Her behavior is normal  Judgment and thought content normal          Patient Instructions   Discussed all with patient  We cannot continue to dispense the amount of Klonopin you have been on but will cut the Klonopin to 1 mg twice daily and start mirtazapine 30 mg before bed  This will make you drowsy but after about 2 weeks this should really help with your anxiety    Next month will cut the Klonopin to 1 mg daily and the next month after that will be off the Klonopin  For now, have the labs done I will call with results and we will follow you here in about 6 weeks  Use the Klonopin sparingly for now and if you could skip a dose skip a dose  Will follow here in 6 weeks  Discussed with pt the list of meds retrieved from the state  JASON Arreaga    Portions of the record may have been created with voice recognition software   Occasional wrong word or "sound a like" substitutions may have occurred due to the inherent limitations of voice recognition software   Read the chart carefully and recognize, using context, where substitutions have occurred

## 2019-03-26 NOTE — TELEPHONE ENCOUNTER
Patient called again and stated that the Mirtazapine is not covered by her insurance  Is there something else that can be sent in

## 2019-03-26 NOTE — TELEPHONE ENCOUNTER
Lis you sent in the disinegrating tablets and I think that's why her insurance wont pay for it       So I changes it to the tablets please send to pharmacy Thank You

## 2019-03-26 NOTE — ASSESSMENT & PLAN NOTE
Discussed with patient the judicious use of Klonopin  I will attempt to get her completely off this medication as this is not a medication I would use long term  Will start 30 mg mirtazapine at night and this month will cut the klonopin to 1 mg bid  Next month will cut klonopin to 1 mg daily  Following month off meds

## 2019-03-26 NOTE — TELEPHONE ENCOUNTER
Patient called back stating that someone was supposed to call her back about her clonazepam  Patient stated that the tablet was supposed to be a disintegrating tablet  I explained that I could see that the clonazepam that was sent to her pharmacy is disintegrating  Patient mentioned that there was some other discrepancy with her medication but could not specify what the problem was exactly  Let patient know a message would be sent over to her office regarding concern over her clonazepam and to follow up in the morning  Patient verbalized understanding

## 2019-03-27 DIAGNOSIS — G47.00 INSOMNIA, UNSPECIFIED TYPE: ICD-10-CM

## 2019-03-27 DIAGNOSIS — F41.9 ANXIETY: ICD-10-CM

## 2019-03-27 RX ORDER — MIRTAZAPINE 30 MG/1
15 TABLET, FILM COATED ORAL
Qty: 30 TABLET | Refills: 1 | Status: SHIPPED | OUTPATIENT
Start: 2019-03-27 | End: 2019-05-10 | Stop reason: ALTCHOICE

## 2019-03-27 RX ORDER — CLONAZEPAM 1 MG/1
1 TABLET ORAL 2 TIMES DAILY
Qty: 30 TABLET | Refills: 0 | Status: SHIPPED | OUTPATIENT
Start: 2019-03-27 | End: 2019-04-21 | Stop reason: SDUPTHER

## 2019-04-21 DIAGNOSIS — G47.00 INSOMNIA, UNSPECIFIED TYPE: ICD-10-CM

## 2019-04-21 DIAGNOSIS — F41.9 ANXIETY: ICD-10-CM

## 2019-04-22 RX ORDER — CLONAZEPAM 1 MG/1
TABLET ORAL
Qty: 30 TABLET | Refills: 0 | Status: SHIPPED | OUTPATIENT
Start: 2019-04-22 | End: 2019-05-10 | Stop reason: SDUPTHER

## 2019-05-10 ENCOUNTER — OFFICE VISIT (OUTPATIENT)
Dept: FAMILY MEDICINE CLINIC | Facility: CLINIC | Age: 34
End: 2019-05-10
Payer: COMMERCIAL

## 2019-05-10 VITALS
HEIGHT: 64 IN | HEART RATE: 104 BPM | SYSTOLIC BLOOD PRESSURE: 100 MMHG | RESPIRATION RATE: 12 BRPM | TEMPERATURE: 98.5 F | WEIGHT: 122.8 LBS | OXYGEN SATURATION: 98 % | DIASTOLIC BLOOD PRESSURE: 70 MMHG | BODY MASS INDEX: 20.96 KG/M2

## 2019-05-10 DIAGNOSIS — G47.00 INSOMNIA, UNSPECIFIED TYPE: ICD-10-CM

## 2019-05-10 DIAGNOSIS — F41.0 PANIC DISORDER: Primary | ICD-10-CM

## 2019-05-10 DIAGNOSIS — F41.9 ANXIETY: ICD-10-CM

## 2019-05-10 PROCEDURE — 99214 OFFICE O/P EST MOD 30 MIN: CPT | Performed by: FAMILY MEDICINE

## 2019-05-10 RX ORDER — CLONAZEPAM 1 MG/1
TABLET ORAL
Qty: 30 TABLET | Refills: 0 | Status: SHIPPED | OUTPATIENT
Start: 2019-05-10 | End: 2019-06-16 | Stop reason: SDUPTHER

## 2019-05-10 RX ORDER — TIZANIDINE 2 MG/1
2 TABLET ORAL EVERY 8 HOURS PRN
COMMUNITY
End: 2019-11-21 | Stop reason: ALTCHOICE

## 2019-05-10 RX ORDER — PAROXETINE HYDROCHLORIDE 20 MG/1
TABLET, FILM COATED ORAL
Qty: 30 TABLET | Refills: 0 | Status: SHIPPED | OUTPATIENT
Start: 2019-05-10 | End: 2019-05-20 | Stop reason: SINTOL

## 2019-05-10 RX ORDER — TRAZODONE HYDROCHLORIDE 50 MG/1
50 TABLET ORAL
Qty: 30 TABLET | Refills: 0 | Status: SHIPPED | OUTPATIENT
Start: 2019-05-10 | End: 2019-06-11 | Stop reason: SDUPTHER

## 2019-05-20 DIAGNOSIS — F41.9 ANXIETY: Primary | ICD-10-CM

## 2019-05-20 RX ORDER — CITALOPRAM 10 MG/1
TABLET ORAL
Qty: 30 TABLET | Refills: 5 | Status: SHIPPED | OUTPATIENT
Start: 2019-05-20 | End: 2019-06-11 | Stop reason: SDUPTHER

## 2019-06-07 ENCOUNTER — APPOINTMENT (OUTPATIENT)
Dept: LAB | Facility: HOSPITAL | Age: 34
End: 2019-06-07
Payer: COMMERCIAL

## 2019-06-07 DIAGNOSIS — F41.9 ANXIETY: ICD-10-CM

## 2019-06-07 DIAGNOSIS — G47.00 INSOMNIA, UNSPECIFIED TYPE: ICD-10-CM

## 2019-06-07 LAB
ALBUMIN SERPL BCP-MCNC: 4.2 G/DL (ref 3.5–5)
ALP SERPL-CCNC: 72 U/L (ref 46–116)
ALT SERPL W P-5'-P-CCNC: 29 U/L (ref 12–78)
ANION GAP SERPL CALCULATED.3IONS-SCNC: 11 MMOL/L (ref 4–13)
AST SERPL W P-5'-P-CCNC: 12 U/L (ref 5–45)
BILIRUB SERPL-MCNC: 0.2 MG/DL (ref 0.2–1)
BUN SERPL-MCNC: 15 MG/DL (ref 5–25)
CALCIUM SERPL-MCNC: 9.2 MG/DL (ref 8.3–10.1)
CHLORIDE SERPL-SCNC: 107 MMOL/L (ref 100–108)
CO2 SERPL-SCNC: 25 MMOL/L (ref 21–32)
CREAT SERPL-MCNC: 0.72 MG/DL (ref 0.6–1.3)
GFR SERPL CREATININE-BSD FRML MDRD: 111 ML/MIN/1.73SQ M
GLUCOSE SERPL-MCNC: 92 MG/DL (ref 65–140)
POTASSIUM SERPL-SCNC: 4 MMOL/L (ref 3.5–5.3)
PROT SERPL-MCNC: 7.5 G/DL (ref 6.4–8.2)
SODIUM SERPL-SCNC: 143 MMOL/L (ref 136–145)
TSH SERPL DL<=0.05 MIU/L-ACNC: 0.38 UIU/ML (ref 0.36–3.74)

## 2019-06-07 PROCEDURE — 36415 COLL VENOUS BLD VENIPUNCTURE: CPT

## 2019-06-07 PROCEDURE — 84443 ASSAY THYROID STIM HORMONE: CPT

## 2019-06-07 PROCEDURE — 80053 COMPREHEN METABOLIC PANEL: CPT

## 2019-06-11 ENCOUNTER — OFFICE VISIT (OUTPATIENT)
Dept: FAMILY MEDICINE CLINIC | Facility: CLINIC | Age: 34
End: 2019-06-11
Payer: COMMERCIAL

## 2019-06-11 VITALS
OXYGEN SATURATION: 97 % | BODY MASS INDEX: 19.97 KG/M2 | HEART RATE: 107 BPM | WEIGHT: 117 LBS | HEIGHT: 64 IN | SYSTOLIC BLOOD PRESSURE: 110 MMHG | DIASTOLIC BLOOD PRESSURE: 64 MMHG

## 2019-06-11 DIAGNOSIS — F41.9 ANXIETY: ICD-10-CM

## 2019-06-11 DIAGNOSIS — G47.00 INSOMNIA, UNSPECIFIED TYPE: ICD-10-CM

## 2019-06-11 DIAGNOSIS — F41.0 PANIC DISORDER: Primary | ICD-10-CM

## 2019-06-11 PROCEDURE — 99214 OFFICE O/P EST MOD 30 MIN: CPT | Performed by: FAMILY MEDICINE

## 2019-06-11 PROCEDURE — 3008F BODY MASS INDEX DOCD: CPT | Performed by: FAMILY MEDICINE

## 2019-06-11 RX ORDER — TRAZODONE HYDROCHLORIDE 50 MG/1
TABLET ORAL
Qty: 30 TABLET | Refills: 0 | Status: SHIPPED | OUTPATIENT
Start: 2019-06-11 | End: 2019-07-23 | Stop reason: SINTOL

## 2019-06-11 RX ORDER — CITALOPRAM 10 MG/1
TABLET ORAL
Qty: 30 TABLET | Refills: 0 | Status: SHIPPED | OUTPATIENT
Start: 2019-06-11 | End: 2019-07-22 | Stop reason: SDUPTHER

## 2019-06-16 DIAGNOSIS — G47.00 INSOMNIA, UNSPECIFIED TYPE: ICD-10-CM

## 2019-06-16 DIAGNOSIS — F41.9 ANXIETY: ICD-10-CM

## 2019-06-17 RX ORDER — CLONAZEPAM 1 MG/1
TABLET ORAL
Qty: 15 TABLET | Refills: 0 | Status: SHIPPED | OUTPATIENT
Start: 2019-06-17 | End: 2019-07-13 | Stop reason: SDUPTHER

## 2019-07-13 DIAGNOSIS — F41.9 ANXIETY: ICD-10-CM

## 2019-07-13 DIAGNOSIS — G47.00 INSOMNIA, UNSPECIFIED TYPE: ICD-10-CM

## 2019-07-15 RX ORDER — CLONAZEPAM 0.5 MG/1
TABLET ORAL
Qty: 15 TABLET | Refills: 0 | Status: SHIPPED | OUTPATIENT
Start: 2019-07-15 | End: 2019-11-21 | Stop reason: ALTCHOICE

## 2019-07-22 DIAGNOSIS — F41.0 PANIC DISORDER: ICD-10-CM

## 2019-07-22 DIAGNOSIS — F41.9 ANXIETY: ICD-10-CM

## 2019-07-23 RX ORDER — TRAZODONE HYDROCHLORIDE 50 MG/1
TABLET ORAL
Qty: 30 TABLET | Refills: 0 | Status: CANCELLED | OUTPATIENT
Start: 2019-07-23

## 2019-07-23 RX ORDER — CITALOPRAM 20 MG/1
TABLET ORAL
Qty: 30 TABLET | Refills: 1 | Status: SHIPPED | OUTPATIENT
Start: 2019-07-23 | End: 2019-09-15 | Stop reason: SDUPTHER

## 2019-09-15 DIAGNOSIS — F41.9 ANXIETY: ICD-10-CM

## 2019-09-15 RX ORDER — CITALOPRAM 20 MG/1
TABLET ORAL
Qty: 30 TABLET | Refills: 1 | Status: SHIPPED | OUTPATIENT
Start: 2019-09-15 | End: 2019-11-01 | Stop reason: SDUPTHER

## 2019-10-02 DIAGNOSIS — G47.00 INSOMNIA, UNSPECIFIED TYPE: Primary | ICD-10-CM

## 2019-10-02 RX ORDER — TRAZODONE HYDROCHLORIDE 50 MG/1
TABLET ORAL
Qty: 30 TABLET | Refills: 0 | Status: SHIPPED | OUTPATIENT
Start: 2019-10-02 | End: 2019-11-01 | Stop reason: SDUPTHER

## 2019-11-01 ENCOUNTER — OFFICE VISIT (OUTPATIENT)
Dept: FAMILY MEDICINE CLINIC | Facility: CLINIC | Age: 34
End: 2019-11-01
Payer: COMMERCIAL

## 2019-11-01 VITALS
SYSTOLIC BLOOD PRESSURE: 108 MMHG | BODY MASS INDEX: 20.66 KG/M2 | WEIGHT: 121 LBS | HEART RATE: 89 BPM | OXYGEN SATURATION: 97 % | TEMPERATURE: 98.6 F | HEIGHT: 64 IN | DIASTOLIC BLOOD PRESSURE: 64 MMHG

## 2019-11-01 DIAGNOSIS — G47.00 INSOMNIA, UNSPECIFIED TYPE: ICD-10-CM

## 2019-11-01 DIAGNOSIS — F41.9 ANXIETY: ICD-10-CM

## 2019-11-01 DIAGNOSIS — N91.2 AMENORRHEA: Primary | ICD-10-CM

## 2019-11-01 PROCEDURE — 99214 OFFICE O/P EST MOD 30 MIN: CPT | Performed by: FAMILY MEDICINE

## 2019-11-01 PROCEDURE — 3008F BODY MASS INDEX DOCD: CPT | Performed by: FAMILY MEDICINE

## 2019-11-01 RX ORDER — CITALOPRAM 40 MG/1
TABLET ORAL
Qty: 30 TABLET | Refills: 1 | Status: SHIPPED | OUTPATIENT
Start: 2019-11-01 | End: 2019-11-14 | Stop reason: SDUPTHER

## 2019-11-01 RX ORDER — TRAZODONE HYDROCHLORIDE 50 MG/1
TABLET ORAL
Qty: 45 TABLET | Refills: 1 | Status: SHIPPED | OUTPATIENT
Start: 2019-11-01 | End: 2019-12-29 | Stop reason: SDUPTHER

## 2019-11-01 NOTE — PATIENT INSTRUCTIONS
Discussed all with patient  We are almost there with the anxiety  Will increase the citalopram to 40 mg daily  For the sleep increase the trazodone to 75 mg at night which is 1-1/2 pills  Have the blood work done now it is not a fasting blood test   I would like you to schedule an appointment for a Pap smear

## 2019-11-01 NOTE — PROGRESS NOTES
Assessment/Plan:     Chronic Problems:  Amenorrhea  Will get labs now and call with results  Schedule pap  Anxiety  Panic attacks much less frequent and much less intense  Will increase citalopram to 40 mg daily  Insomnia  Increase trazodone to 75 mg nightly  Visit Diagnosis:  Diagnoses and all orders for this visit:    Amenorrhea  -     hCG, quantitative; Future  -     TSH, 3rd generation with Free T4 reflex; Future  -     FSH and LH; Future  -     Estradiol, Free, LC/MS/MS; Future    Anxiety  -     citalopram (CeleXA) 40 mg tablet; take 1 tablet by mouth once daily    Insomnia, unspecified type  -     traZODone (DESYREL) 50 mg tablet; Take 1 1/2 po qhs prn insomnia  Subjective:    Patient ID: Naman Chamberlain is a 30 y o  female  Pt is here for f/u on her meds  Feels the meds for sleep help her fall asleep but not stay asleep  Currently on 50 mg of trazodone  Feels the citalopram is good, still with anxiety throughout the day but better than it was  Feels her anxiety is mostly from work and out of the blue  Not r/t an incident  Still feels like someone is sitting on her chest while she is at work  Also gets sob with this and could last up to 1/2 hour  Feels she is at least 70% better  Finally off klonopin  Feels this is a good thing for her  Agreeable to go to 40 mg of citalopram  Has not had a pap in 8 years  Takes all other meds as directed  No side effects noted  The following portions of the patient's history were reviewed and updated as appropriate: allergies, current medications, past family history, past medical history, past social history, past surgical history and problem list     Review of Systems   Constitutional: Negative for chills, diaphoresis, fatigue and fever  HENT: Positive for postnasal drip and sore throat (but not sick  )  Negative for congestion, rhinorrhea, sinus pressure and sinus pain  Eyes: Negative      Respiratory: Negative for cough, shortness of breath and wheezing  Cardiovascular: Negative for chest pain and palpitations  Gastrointestinal: Negative  Genitourinary: Negative for difficulty urinating, dysuria, frequency and urgency  FDLMP - end of August  H/o no menses for 4 years in the past  Possibility of pregnancy  Musculoskeletal: Negative  Neurological: Negative for dizziness, light-headedness and headaches  Psychiatric/Behavioral: Positive for sleep disturbance  Negative for dysphoric mood and suicidal ideas  The patient is nervous/anxious            /64   Pulse 89   Temp 98 6 °F (37 °C)   Ht 5' 4" (1 626 m)   Wt 54 9 kg (121 lb)   SpO2 97%   BMI 20 77 kg/m²   Social History     Socioeconomic History    Marital status: Single     Spouse name: Not on file    Number of children: Not on file    Years of education: Not on file    Highest education level: Not on file   Occupational History    Not on file   Social Needs    Financial resource strain: Not on file    Food insecurity:     Worry: Not on file     Inability: Not on file    Transportation needs:     Medical: Not on file     Non-medical: Not on file   Tobacco Use    Smoking status: Light Tobacco Smoker    Smokeless tobacco: Never Used    Tobacco comment: smoking for the past 10 years   Substance and Sexual Activity    Alcohol use: No    Drug use: No    Sexual activity: Not on file   Lifestyle    Physical activity:     Days per week: Not on file     Minutes per session: Not on file    Stress: Not on file   Relationships    Social connections:     Talks on phone: Not on file     Gets together: Not on file     Attends Zoroastrianism service: Not on file     Active member of club or organization: Not on file     Attends meetings of clubs or organizations: Not on file     Relationship status: Not on file    Intimate partner violence:     Fear of current or ex partner: Not on file     Emotionally abused: Not on file     Physically abused: Not on file Forced sexual activity: Not on file   Other Topics Concern    Not on file   Social History Narrative    ** Merged History Encounter **          Past Medical History:   Diagnosis Date    Anxiety     Migraine     Mood swings     Urinary incontinence      Family History   Problem Relation Age of Onset    Cancer Mother     Cancer Maternal Grandmother     Cancer Paternal Grandfather      Past Surgical History:   Procedure Laterality Date    CHOLECYSTECTOMY      KNEE SURGERY         Current Outpatient Medications:     citalopram (CeleXA) 40 mg tablet, take 1 tablet by mouth once daily, Disp: 30 tablet, Rfl: 1    clonazePAM (KlonoPIN) 0 5 mg tablet, Take 1/2 to 1 po bid prn anxiety, Disp: 15 tablet, Rfl: 0    fluticasone (FLONASE) 50 mcg/act nasal spray, 2 sprays into each nostril daily, Disp: 16 g, Rfl: 0    tiZANidine (ZANAFLEX) 2 mg tablet, Take 2 mg by mouth every 8 (eight) hours as needed for muscle spasms, Disp: , Rfl:     traZODone (DESYREL) 50 mg tablet, Take 1 1/2 po qhs prn insomnia , Disp: 45 tablet, Rfl: 1    Allergies   Allergen Reactions    Other      seasonal          Lab Review   No visits with results within 2 Month(s) from this visit  Latest known visit with results is:   Appointment on 06/07/2019   Component Date Value    TSH 3RD GENERATON 06/07/2019 0 381     Sodium 06/07/2019 143     Potassium 06/07/2019 4 0     Chloride 06/07/2019 107     CO2 06/07/2019 25     ANION GAP 06/07/2019 11     BUN 06/07/2019 15     Creatinine 06/07/2019 0 72     Glucose 06/07/2019 92     Calcium 06/07/2019 9 2     AST 06/07/2019 12     ALT 06/07/2019 29     Alkaline Phosphatase 06/07/2019 72     Total Protein 06/07/2019 7 5     Albumin 06/07/2019 4 2     Total Bilirubin 06/07/2019 0 20     eGFR 06/07/2019 111         Imaging: No results found  Objective:     Physical Exam   Constitutional: She is oriented to person, place, and time  She appears well-developed and well-nourished   No distress  HENT:   Head: Normocephalic and atraumatic  Right Ear: External ear normal    Left Ear: External ear normal    Mouth/Throat: Oropharynx is clear and moist    Tonsillar debris right tonsil  Eyes: Pupils are equal, round, and reactive to light  Conjunctivae and EOM are normal  Right eye exhibits no discharge  Left eye exhibits no discharge  Neck: Normal range of motion  Neck supple  Cardiovascular: Normal rate, regular rhythm and normal heart sounds  Exam reveals no friction rub  No murmur heard  Pulmonary/Chest: Effort normal and breath sounds normal  No respiratory distress  She has no wheezes  She has no rales  She exhibits no tenderness  Abdominal: Soft  Bowel sounds are normal  There is no tenderness  Musculoskeletal: Normal range of motion  She exhibits no edema, tenderness or deformity  Lymphadenopathy:     She has no cervical adenopathy  Neurological: She is alert and oriented to person, place, and time  No cranial nerve deficit  Skin: Skin is warm and dry  No rash noted  She is not diaphoretic  Psychiatric: She has a normal mood and affect  Her behavior is normal  Judgment and thought content normal          Patient Instructions   Discussed all with patient  We are almost there with the anxiety  Will increase the citalopram to 40 mg daily  For the sleep increase the trazodone to 75 mg at night which is 1-1/2 pills  Have the blood work done now it is not a fasting blood test   I would like you to schedule an appointment for a Pap smear  JASON Arreaga    Portions of the record may have been created with voice recognition software  Occasional wrong word or "sound a like" substitutions may have occurred due to the inherent limitations of voice recognition software  Read the chart carefully and recognize, using context, where substitutions have occurred

## 2019-11-14 DIAGNOSIS — F41.9 ANXIETY: ICD-10-CM

## 2019-11-14 RX ORDER — CITALOPRAM 40 MG/1
TABLET ORAL
Qty: 30 TABLET | Refills: 3 | Status: SHIPPED | OUTPATIENT
Start: 2019-11-14 | End: 2020-02-13

## 2019-11-21 ENCOUNTER — TELEPHONE (OUTPATIENT)
Dept: FAMILY MEDICINE CLINIC | Facility: CLINIC | Age: 34
End: 2019-11-21

## 2019-11-21 ENCOUNTER — APPOINTMENT (OUTPATIENT)
Dept: LAB | Facility: HOSPITAL | Age: 34
End: 2019-11-21
Payer: COMMERCIAL

## 2019-11-21 DIAGNOSIS — N91.2 AMENORRHEA: ICD-10-CM

## 2019-11-21 DIAGNOSIS — Z34.90 PREGNANCY, UNSPECIFIED GESTATIONAL AGE: Primary | ICD-10-CM

## 2019-11-21 LAB
B-HCG SERPL-ACNC: ABNORMAL MIU/ML
ESTRADIOL SERPL-MCNC: 2039 PG/ML
FSH SERPL-ACNC: <0.2 MIU/ML
LH SERPL-ACNC: <0.2 MIU/ML
TSH SERPL DL<=0.05 MIU/L-ACNC: 0.61 UIU/ML (ref 0.36–3.74)

## 2019-11-21 PROCEDURE — 36415 COLL VENOUS BLD VENIPUNCTURE: CPT

## 2019-11-21 PROCEDURE — 82670 ASSAY OF TOTAL ESTRADIOL: CPT

## 2019-11-21 PROCEDURE — 83001 ASSAY OF GONADOTROPIN (FSH): CPT

## 2019-11-21 PROCEDURE — 83002 ASSAY OF GONADOTROPIN (LH): CPT

## 2019-11-21 PROCEDURE — 84702 CHORIONIC GONADOTROPIN TEST: CPT

## 2019-11-21 PROCEDURE — 84443 ASSAY THYROID STIM HORMONE: CPT

## 2019-11-21 RX ORDER — PRENATAL WITH FERROUS FUM AND FOLIC ACID 3080; 920; 120; 400; 22; 1.84; 3; 20; 10; 1; 12; 200; 27; 25; 2 [IU]/1; [IU]/1; MG/1; [IU]/1; MG/1; MG/1; MG/1; MG/1; MG/1; MG/1; UG/1; MG/1; MG/1; MG/1; MG/1
1 TABLET ORAL DAILY
Qty: 90 EACH | Refills: 3 | Status: SHIPPED | OUTPATIENT
Start: 2019-11-21 | End: 2021-08-10 | Stop reason: ALTCHOICE

## 2019-11-21 NOTE — TELEPHONE ENCOUNTER
----- Message from [address] [place] [address]  sent at 11/21/2019 12:46 PM EST -----  Please let her know she is pregnant  She needs to stop the tizanidine, klonopin and trazodone  Start prenatal vitamins  I called them in  Needs to establish with gyn  Would she like a referral to see someone in [address] [place] [address]?

## 2019-11-25 ENCOUNTER — TELEPHONE (OUTPATIENT)
Dept: OBGYN CLINIC | Facility: CLINIC | Age: 34
End: 2019-11-25

## 2019-11-25 DIAGNOSIS — Z3A.13 13 WEEKS GESTATION OF PREGNANCY: Primary | ICD-10-CM

## 2019-11-25 NOTE — TELEPHONE ENCOUNTER
Keily Dominguez I am not sure if she ever called back after I called her three times last week but she has an apt with you on 123/2/2019

## 2019-11-25 NOTE — TELEPHONE ENCOUNTER
I have been trying since last week and tried contacting her emergency contact as well and left message on his voice mail to have her call us back   I will keep tyring

## 2019-11-25 NOTE — TELEPHONE ENCOUNTER
Please try to get in touch with her  I don't know that she knows she is pregnant and I want her to stop those meds and start prenatals   ty

## 2019-12-28 DIAGNOSIS — G47.00 INSOMNIA, UNSPECIFIED TYPE: ICD-10-CM

## 2019-12-29 RX ORDER — TRAZODONE HYDROCHLORIDE 50 MG/1
TABLET ORAL
Qty: 45 TABLET | Refills: 0 | Status: SHIPPED | OUTPATIENT
Start: 2019-12-29 | End: 2020-02-13 | Stop reason: ALTCHOICE

## 2020-01-21 ENCOUNTER — TELEPHONE (OUTPATIENT)
Dept: OBGYN CLINIC | Facility: CLINIC | Age: 35
End: 2020-01-21

## 2020-01-21 DIAGNOSIS — Z34.02 ENCOUNTER FOR SUPERVISION OF NORMAL FIRST PREGNANCY IN SECOND TRIMESTER: Primary | ICD-10-CM

## 2020-01-21 NOTE — TELEPHONE ENCOUNTER
----- Message from Yoel Romero sent at 1/21/2020  2:10 PM EST -----  Contact: [phone]  Patient needs [facility] referral for ultrasound  LMP 08/30/19

## 2020-01-24 ENCOUNTER — TELEPHONE (OUTPATIENT)
Dept: OTHER | Facility: HOSPITAL | Age: 35
End: 2020-01-24

## 2020-01-24 NOTE — TELEPHONE ENCOUNTER
PATIENT CLD WANTING THE OFFICE TO KNOW THAT SHE REC'D THE VOICEMAIL MESSAGE REGARDING QUESTIONS THEY HAVE FOR HER   PLEASE RETURN CALL

## 2020-01-27 NOTE — TELEPHONE ENCOUNTER
Returning pts call from weekend to answering service, a referral to [facility] was placed for her in November, on 1/22/20 she did make appt with [facility] for 1/31/20  Advised pt to call us after her Ultrasound so we can start her prenatal care plan

## 2020-01-31 ENCOUNTER — ROUTINE PRENATAL (OUTPATIENT)
Dept: PERINATAL CARE | Facility: OTHER | Age: 35
End: 2020-01-31
Payer: COMMERCIAL

## 2020-01-31 VITALS
DIASTOLIC BLOOD PRESSURE: 76 MMHG | SYSTOLIC BLOOD PRESSURE: 118 MMHG | BODY MASS INDEX: 24.17 KG/M2 | WEIGHT: 141.6 LBS | HEIGHT: 64 IN | HEART RATE: 109 BPM

## 2020-01-31 DIAGNOSIS — D16.9 OSTEOCHONDROMA: ICD-10-CM

## 2020-01-31 DIAGNOSIS — Z72.0 TOBACCO ABUSE: ICD-10-CM

## 2020-01-31 DIAGNOSIS — Z3A.23 23 WEEKS GESTATION OF PREGNANCY: Primary | ICD-10-CM

## 2020-01-31 DIAGNOSIS — F41.9 ANXIETY: ICD-10-CM

## 2020-01-31 PROCEDURE — 76817 TRANSVAGINAL US OBSTETRIC: CPT | Performed by: OBSTETRICS & GYNECOLOGY

## 2020-01-31 PROCEDURE — 76811 OB US DETAILED SNGL FETUS: CPT | Performed by: OBSTETRICS & GYNECOLOGY

## 2020-01-31 PROCEDURE — 99202 OFFICE O/P NEW SF 15 MIN: CPT | Performed by: OBSTETRICS & GYNECOLOGY

## 2020-01-31 NOTE — PROGRESS NOTES
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   Cholecystectomy in   Social Hx: Heavy use of tobacco > 1 ppd; no alcohol or illicit drugs    Medications: DCd  Trazadone and Citalopram for anxiety  Family Hx: non contributory    Allergies: None    We discussed that tobacco use during pregnancy is associated with an increased risk for adverse pregnancy outcomes, including  cleft lip and palate,  delivery, fetal growth restriction, abruptio placentae, and stillbirth and in the  period, SIDS, otitis and obesity  Encouraged her to D/C smoking  Reviewed how use of nicotine replacement therapy has not been shown to be safe in pregnancy and it is not recommended  Counseling to reduce risk of recurrence of postpartum depression, as she has a diagnosis of anxiety  There are significant risks associated with untreated and under treated depression and other mental illnesses in pregnancy  The [address] [place] [address] is an additional resource for screening and treatment of depression  Resources for a mental health crisis include the emergency department and the [facility] at [phone]  I encouraged Ms Erica Campo to discuss with you how to start counseling in pregnancy about anxiety and to plan care in case she presents postpartum depression  Recommendations:    1    Follow-up ultrasound in 4 weeks to monitor growth (Heavy tobacco use)      In addition to review of the ultrasound results I completed a consultation in 20 minutes with > 50% in direct face to face contact and coordination of a plan of care  Thank you for referring your patient to our offices  The limitations of ultrasound to detect all anomalies was reviewed and how it is not  a test to rule out aneuploidy  If you have any further questions do not hesitate to contact us as [phone]      Bryan Colon MD

## 2020-02-13 ENCOUNTER — INITIAL PRENATAL (OUTPATIENT)
Dept: OBGYN CLINIC | Facility: CLINIC | Age: 35
End: 2020-02-13
Payer: COMMERCIAL

## 2020-02-13 VITALS — WEIGHT: 138.6 LBS | DIASTOLIC BLOOD PRESSURE: 74 MMHG | BODY MASS INDEX: 23.79 KG/M2 | SYSTOLIC BLOOD PRESSURE: 114 MMHG

## 2020-02-13 VITALS
HEIGHT: 64 IN | HEART RATE: 92 BPM | BODY MASS INDEX: 23.56 KG/M2 | SYSTOLIC BLOOD PRESSURE: 126 MMHG | DIASTOLIC BLOOD PRESSURE: 68 MMHG | WEIGHT: 138 LBS

## 2020-02-13 DIAGNOSIS — Z11.3 SCREEN FOR STD (SEXUALLY TRANSMITTED DISEASE): ICD-10-CM

## 2020-02-13 DIAGNOSIS — Z34.02 ENCOUNTER FOR SUPERVISION OF NORMAL FIRST PREGNANCY IN SECOND TRIMESTER: Primary | ICD-10-CM

## 2020-02-13 DIAGNOSIS — O09.32 LATE PRENATAL CARE AFFECTING PREGNANCY IN SECOND TRIMESTER: ICD-10-CM

## 2020-02-13 DIAGNOSIS — F41.9 ANXIETY: ICD-10-CM

## 2020-02-13 LAB
SL AMB  POCT GLUCOSE, UA: NEGATIVE
SL AMB POCT URINE PROTEIN: POSITIVE

## 2020-02-13 PROCEDURE — G0145 SCR C/V CYTO,THINLAYER,RESCR: HCPCS | Performed by: PATHOLOGY

## 2020-02-13 PROCEDURE — G0124 SCREEN C/V THIN LAYER BY MD: HCPCS | Performed by: PATHOLOGY

## 2020-02-13 PROCEDURE — 87491 CHLMYD TRACH DNA AMP PROBE: CPT | Performed by: NURSE PRACTITIONER

## 2020-02-13 PROCEDURE — 99213 OFFICE O/P EST LOW 20 MIN: CPT | Performed by: NURSE PRACTITIONER

## 2020-02-13 PROCEDURE — OBC: Performed by: STUDENT IN AN ORGANIZED HEALTH CARE EDUCATION/TRAINING PROGRAM

## 2020-02-13 PROCEDURE — 87624 HPV HI-RISK TYP POOLED RSLT: CPT | Performed by: NURSE PRACTITIONER

## 2020-02-13 PROCEDURE — 87591 N.GONORRHOEAE DNA AMP PROB: CPT | Performed by: NURSE PRACTITIONER

## 2020-02-13 NOTE — PROGRESS NOTES
OB INTAKE INTERVIEW  * Pt presents for OB intake 25w late care   * Accompanied by: self  *  *Hx of  delivery prior to 36 weeks 6 days no  *Last Menstrual Period: Pt's LMP was end of 2019  *Ultrasound date:20   23weeks 1days  *Estimated date of delivery: 20   * confirmed by US    *Signs/Symptoms of Pregnancy   *fatigue, muir   *constipation no   *headaches no   *cramping/spotting no   *PICA cravings no  *Diabetes- if you answer yes, please order 1 hour GTT, 50g   *hx of GDM no   *BMI >35 no   *first degree relative with type 2 diabetes no   *hx of PCOS no   *current metformin use no   *prior hx of LGA/macrosomia no   *AMA with other risk factors no  *Hypertension- if you answer yes, please order preeclampsia labs including 24 hour urine protein   *Hx of chronic HTN no   *hx of gestational HTN no   *hx of preeclampsia, eclampsia, or HELLP syndrome no  *Infection Screening-    *does the pt have a hx of MRSA? no   *if yes- please follow MRSA protocol and obtain a nasal swab for MRSA culture   *history of herpes? no   *ok for blood transfusion YES  *Immunizations:   *influenza vaccine given today no-declines    *discussed Tdap vaccine-yes     *Interview education   *[facility] Pregnancy Essentials Book reviewed and discussed   *Handouts given:    *Baby and Me phone becca guide    *Baby and Me support center    *[facility]     *discussed genetic testing- pt interested no     *appointment at [facility] made no-has already seen [facility] for dating     *Prenatal lab work scripts    *Nurse/Family Partnership- pt may qualify YES; referral placed no-info given and she understands that she must enroll by 28wks if interested    *4 P's- substance abuse screening    Presently using? no    Past use?  YES-states she had issues with chronic pain management medications     Partner using? no    Parents/Family using? no    *I have these concerns about this prenatal patient: LATE OB Care unplanned but welcomed pregnancy  Wendy Jacobsen and Brown have broken up after a long term relationship due to her choosing to continue the pregnancy  She has moved in with her sister for now and long term plan is to move to [place] area and live with a different sister that she gets along better with  She works full time at a [employer], discussed support hose  *Details that I feel the provider should be aware of: Was smoking >1ppd but has been trying to quit this past month and is down to 4 a day  Has chronic pain concerns, of her low back and left knee  Pain level today 6/10 on scale  She had a osteochondroma removed from her Left knee in 2004  She states from that time she has been on many pain medications, at one time was on 8 different meds and admits to having a problem with taking only as prescribed  In the past year she has been able to get off all prescription pain medications  States she takes Tylenol back pain and a OTC patch  She does suffer with anxiety and insomnia and was taking celexa and trazodone, but was advised to stop 11/25/19 by her PCP  She is not sleeping much, we did discuss safe sleep aids for her to try  She also feels like her anxiety is intense at times and has noticed increase mood swings  Offered therapy services at the [facility] but she declines at this time  PN1 visit scheduled  The patient was oriented to our practice and all questions were answered  [employer] Delivery, if she stay is this area  We discussed the importance of routine OB care and the need for her to keep appts with us until she transfers care to [facility]       Interviewed by: Bernie Ngo RN

## 2020-02-13 NOTE — PROGRESS NOTES
Patient is here for PN-1     25 wk       Patient denies any loss of fluid, bleeding or cramping  POCT urine today is positive trace protein and negative for glucose

## 2020-02-13 NOTE — PATIENT INSTRUCTIONS
Pregnancy at 23 to 26 [address] [place] [address] Avenue:   What changes are happening in my body? You are now close to or at the beginning of the third trimester  The third trimester starts at 24 weeks and ends with delivery  As your baby gets larger, you may develop certain symptoms  These may include pain in your back or down the sides of your abdomen  You may also have stretch marks on your abdomen, breasts, thighs, or buttocks  You may also have constipation  How do I care for myself at this stage of my pregnancy? · Eat a variety of healthy foods  Healthy foods include fruits, vegetables, whole-grain breads, low-fat dairy foods, beans, lean meats, and fish  Drink liquids as directed  Ask how much liquid to drink each day and which liquids are best for you  Limit caffeine to less than 200 milligrams each day  Limit your intake of fish to 2 servings each week  Choose fish low in mercury such as canned light tuna, shrimp, salmon, cod, or tilapia  Do not  eat fish high in mercury such as swordfish, tilefish, lenore mackerel, and shark  · Manage back pain  Do not stand for long periods of time or lift heavy items  Use good posture while you stand, squat, or bend  Wear low-heeled shoes with good support  Rest may also help to relieve back pain  Ask your healthcare provider about exercises you can do to strengthen your back muscles  · Take prenatal vitamins as directed  Your need for certain vitamins and minerals, such as folic acid, increases during pregnancy  Prenatal vitamins provide some of the extra vitamins and minerals you need  Prenatal vitamins may also help to decrease the risk of certain birth defects  · Talk to your healthcare provider about exercise  Moderate exercise can help you stay fit  Your healthcare provider will help you plan an exercise program that is safe for you during pregnancy  · Do not smoke  If you smoke, it is never too late to quit   Smoking increases your risk of a miscarriage and other health problems during your pregnancy  Smoking can cause your baby to be born too early or weigh less at birth  Ask your healthcare provider for information if you need help quitting  · Do not drink alcohol  Alcohol passes from your body to your baby through the placenta  It can affect your baby's brain development and cause fetal alcohol syndrome (FAS)  FAS is a group of conditions that causes mental, behavior, and growth problems  · Talk to your healthcare provider before you take any medicines  Many medicines may harm your baby if you take them when you are pregnant  Do not take any medicines, vitamins, herbs, or supplements without first talking to your healthcare provider  Never use illegal or street drugs (such as marijuana or cocaine) while you are pregnant  What are some safety tips during pregnancy? · Avoid hot tubs and saunas  Do not use a hot tub or sauna while you are pregnant, especially during your first trimester  Hot tubs and saunas may raise your baby's temperature and increase the risk of birth defects  · Avoid toxoplasmosis  This is an infection caused by eating raw meat or being around infected cat feces  It can cause birth defects, miscarriages, and other problems  Wash your hands after you touch raw meat  Make sure any meat is well-cooked before you eat it  Avoid raw eggs and unpasteurized milk  Use gloves or ask someone else to clean your cat's litter box while you are pregnant  What changes are happening with my baby? By 26 weeks, your baby will weigh about 2 pounds  Your baby will be about 10 inches long from the top of the head to the rump (baby's bottom)  Your baby's movements are much stronger now  Your baby's eyes are almost completely formed and can partially open  Your baby also sleeps and wakes up  What do I need to know about prenatal care? Your healthcare provider will check your blood pressure and weight   You may also need the following:  · A urine test  may also be done to check for sugar and protein  These can be signs of gestational diabetes or infection  Protein in your urine may also be a sign of preeclampsia  Preeclampsia is a condition that can develop during week 20 or later of your pregnancy  It causes high blood pressure, and it can cause problems with your kidneys and other organs  · Fundal height  is a measurement of your uterus to check your baby's growth  This number is usually the same as the number of weeks that you have been pregnant  · Your baby's heart rate  will be checked  When should I seek immediate care? · You develop a severe headache that does not go away  · You have new or increased vision changes, such as blurred or spotted vision  · You have new or increased swelling in your face or hands  · You have vaginal spotting or bleeding  · Your water broke or you feel warm water gushing or trickling from your vagina  When should I contact my healthcare provider? · You have abdominal cramps, pressure, or tightening  · You have a change in vaginal discharge  · You have light bleeding  · You have chills or a fever  · You have vaginal itching, burning, or pain  · You have yellow, green, white, or foul-smelling vaginal discharge  · You have pain or burning when you urinate, less urine than usual, or pink or bloody urine  · You have questions or concerns about your condition or care  CARE AGREEMENT:   You have the right to help plan your care  Learn about your health condition and how it may be treated  Discuss treatment options with your caregivers to decide what care you want to receive  You always have the right to refuse treatment  The above information is an  only  It is not intended as medical advice for individual conditions or treatments   Talk to your doctor, nurse or pharmacist before following any medical regimen to see if it is safe and effective for you   © 2017 [facility] Information is for End User's use only and may not be sold, redistributed or otherwise used for commercial purposes  All illustrations and images included in CareNotes® are the copyrighted property of A D A M [employer] Inc  or Olegario Maddox

## 2020-02-13 NOTE — ASSESSMENT & PLAN NOTE
We discussed coping mechanisms at work  Stopped her medication "cold turkey" she states when she found out she was pregnant  This is were she has the hardest time with anxiety  She states that she may need medication in the near future  Encouraged to call office if she does at any time feels she is not doing well

## 2020-02-13 NOTE — PROGRESS NOTES
Anxiety  We discussed coping mechanisms at work  Stopped her medication "cold turkey" she states when she found out she was pregnant  This is were she has the hardest time with anxiety  She states that she may need medication in the near future  Encouraged to call office if she does at any time feels she is not doing well  Encounter for supervision of normal first pregnancy in second trimester  Denies any problems today, still processing herself that she is pregnant! Just completed OB Intake today, has lab orders including 1 hour glucose, reviewed to complete ASAP  Did not know she was pregnant due to her history of irregular periods  No history of abnormal paps, last pap was 7 years ago,  Pap and G/C culture collected today,   ++FM felt  First pregnancy  She plans to try breast feeding, encouraged to check the Baby and [address] for classes this week  Will need TDap@ next visit  /Fridge sheet & Perineal massage  Reviewed [address], PTL precautions  Call with any problems, all questions and concerns answered

## 2020-02-13 NOTE — ASSESSMENT & PLAN NOTE
Denies any problems today, still processing herself that she is pregnant! Just completed OB Intake today, has lab orders including 1 hour glucose, reviewed to complete ASAP  Did not know she was pregnant due to her history of irregular periods  No history of abnormal paps, last pap was 7 years ago,  Pap and G/C culture collected today,   ++FM felt  First pregnancy  She plans to try breast feeding, encouraged to check the Baby and [address] for classes this week  Will need TDap@ next visit  /Fridge sheet & Perineal massage  Reviewed [address], PTL precautions  Call with any problems, all questions and concerns answered

## 2020-02-13 NOTE — PATIENT INSTRUCTIONS
Pregnancy at 23 to 26 Weeks   AMBULATORY CARE:   What changes are happening to your body: You are now close to or at the beginning of the third trimester  The third trimester starts at 24 weeks and ends with delivery  As your baby gets larger, you may develop certain symptoms  These may include pain in your back or down the sides of your abdomen  You may also have stretch marks on your abdomen, breasts, thighs, or buttocks  You may also have constipation  Seek care immediately if:   · You develop a severe headache that does not go away  · You have new or increased vision changes, such as blurred or spotted vision  · You have new or increased swelling in your face or hands  · You have vaginal spotting or bleeding  · Your water broke or you feel warm water gushing or trickling from your vagina  Contact your healthcare provider if:   · You have abdominal cramps, pressure, or tightening  · You have a change in vaginal discharge  · You have light bleeding  · You have chills or a fever  · You have vaginal itching, burning, or pain  · You have yellow, green, white, or foul-smelling vaginal discharge  · You have pain or burning when you urinate, less urine than usual, or pink or bloody urine  · You have questions or concerns about your condition or care  How to care for yourself at this stage of your pregnancy:   · Eat a variety of healthy foods  Healthy foods include fruits, vegetables, whole-grain breads, low-fat dairy foods, beans, lean meats, and fish  Drink liquids as directed  Ask how much liquid to drink each day and which liquids are best for you  Limit caffeine to less than 200 milligrams each day  Limit your intake of fish to 2 servings each week  Choose fish low in mercury such as canned light tuna, shrimp, salmon, cod, or tilapia  Do not  eat fish high in mercury such as swordfish, tilefish, lenore mackerel, and shark  · Manage back pain    Do not stand for long periods of time or lift heavy items  Use good posture while you stand, squat, or bend  Wear low-heeled shoes with good support  Rest may also help to relieve back pain  Ask your healthcare provider about exercises you can do to strengthen your back muscles  · Take prenatal vitamins as directed  Your need for certain vitamins and minerals, such as folic acid, increases during pregnancy  Prenatal vitamins provide some of the extra vitamins and minerals you need  Prenatal vitamins may also help to decrease the risk of certain birth defects  · Talk to your healthcare provider about exercise  Moderate exercise can help you stay fit  Your healthcare provider will help you plan an exercise program that is safe for you during pregnancy  · Do not smoke  If you smoke, it is never too late to quit  Smoking increases your risk of a miscarriage and other health problems during your pregnancy  Smoking can cause your baby to be born too early or weigh less at birth  Ask your healthcare provider for information if you need help quitting  · Do not drink alcohol  Alcohol passes from your body to your baby through the placenta  It can affect your baby's brain development and cause fetal alcohol syndrome (FAS)  FAS is a group of conditions that causes mental, behavior, and growth problems  · Talk to your healthcare provider before you take any medicines  Many medicines may harm your baby if you take them when you are pregnant  Do not take any medicines, vitamins, herbs, or supplements without first talking to your healthcare provider  Never use illegal or street drugs (such as marijuana or cocaine) while you are pregnant  Safety tips during pregnancy:   · Avoid hot tubs and saunas  Do not use a hot tub or sauna while you are pregnant, especially during your first trimester  Hot tubs and saunas may raise your baby's temperature and increase the risk of birth defects  · Avoid toxoplasmosis    This is an infection caused by eating raw meat or being around infected cat feces  It can cause birth defects, miscarriages, and other problems  Wash your hands after you touch raw meat  Make sure any meat is well-cooked before you eat it  Avoid raw eggs and unpasteurized milk  Use gloves or ask someone else to clean your cat's litter box while you are pregnant  Changes that are happening with your baby:  By 26 weeks, your baby will weigh about 2 pounds  Your baby will be about 10 inches long from the top of the head to the rump (baby's bottom)  Your baby's movements are much stronger now  Your baby's eyes are almost completely formed and can partially open  Your baby also sleeps and wakes up  What you need to know about prenatal care: Your healthcare provider will check your blood pressure and weight  You may also need the following:  · A urine test  may also be done to check for sugar and protein  These can be signs of gestational diabetes or infection  Protein in your urine may also be a sign of preeclampsia  Preeclampsia is a condition that can develop during week 20 or later of your pregnancy  It causes high blood pressure, and it can cause problems with your kidneys and other organs  · Fundal height  is a measurement of your uterus to check your baby's growth  This number is usually the same as the number of weeks that you have been pregnant  · Your baby's heart rate  will be checked  © 2017 [address] [employer] Information is for End User's use only and may not be sold, redistributed or otherwise used for commercial purposes  All illustrations and images included in CareNotes® are the copyrighted property of [employer]  or [employer]  The above information is an  only  It is not intended as medical advice for individual conditions or treatments  Talk to your doctor, nurse or pharmacist before following any medical regimen to see if it is safe and effective for you    Pregnancy AMBULATORY CARE:   What you need to know about pregnancy:  A normal pregnancy lasts about 40 weeks  The first trimester lasts from your last period through the 12th week of pregnancy  The second trimester lasts from the 13th week of your pregnancy through the 23rd week  The third trimester lasts from your 24th week of pregnancy until your baby is born  If you know the date of your last period, your healthcare provider can estimate your due date  You may give birth to your baby any time from 37 weeks to 2 weeks after your due date  Seek care immediately if:   · You develop a severe headache that does not go away  · You have new or increased vision changes, such as blurred or spotted vision  · You have new or increased swelling in your face or hands  · You have pain or cramping in your abdomen or low back  · You have vaginal bleeding  Contact your healthcare provider or obstetrician if:   · You have abdominal cramps, pressure, or tightening  · You have a change in vaginal discharge  · You cannot keep food or drinks down, and you are losing weight  · You have chills or a fever  · You have vaginal itching, burning, or pain  · You have yellow, green, white, or foul-smelling vaginal discharge  · You have pain or burning when you urinate, less urine than usual, or pink or bloody urine  · You have questions or concerns about your condition or care  Body changes that may occur during your pregnancy:   · Breast changes  you will experience include tenderness and tingling during the early part of your pregnancy  Your breasts will become larger  You may need to use a support bra  You may see a thin, yellow fluid, called colostrum, leak from your nipples during the second trimester  Colostrum is a liquid that changes to milk about 3 days after you give birth  · Skin changes and stretch marks  may occur during your pregnancy  You may have red marks, called stretch marks, on your skin  Stretch marks will usually fade after pregnancy  Use lotion if your skin is dry and itchy  The skin on your face, around your nipples, and below your belly button may darken  Most of the time, your skin will return to its normal color after your baby is born  · Morning sickness  is nausea and vomiting that can happen at any time of day  Avoid fatty and spicy foods  Eat small meals throughout the day instead of large meals  Alix may help to decrease nausea  Ask your healthcare provider about other ways of decreasing nausea and vomiting  · Heartburn  may be caused by changes in your hormones during pregnancy  Your growing uterus may also push your stomach upward and force stomach acid to back up into your esophagus  Eat 4 or 5 small meals each day instead of large meals  Avoid spicy foods  Avoid eating right before bedtime  · Constipation  may develop during your pregnancy  To treat constipation, eat foods high in fiber such as fiber cereals, beans, fruits, vegetables, whole-grain breads, and prune juice  Get regular exercise and drink plenty of water  Your healthcare provider may also suggest a fiber supplement to soften your bowel movements  Talk to your healthcare provider before you use any medicines to decrease constipation  · Hemorrhoids  are enlarged veins in the rectal area  They may cause pain, itching, and bright red bleeding from your rectum  To decrease your risk of hemorrhoids, prevent constipation and do not strain to have a bowel movement  If you have hemorrhoids, soak in a tub of warm water to ease discomfort  Ask your healthcare provider how you can treat hemorrhoids  · Leg cramps and swelling  may be caused by low calcium levels or the added weight of pregnancy  Raise your legs above the level of your heart to decrease swelling  During a leg cramp, stretch or massage the muscle that has the cramp  Heat may help decrease pain and muscle spasms   Apply heat on your muscle for 20 to 30 minutes every 2 hours for as many days as directed  · Back pain  may occur as your baby grows  Do not stand for long periods of time or lift heavy items  Use good posture while you stand, squat, or bend  Wear low-heeled shoes with good support  Rest may also help to relieve back pain  Ask your healthcare provider about exercises you can do to strengthen your back muscles  Stay healthy during your pregnancy:   · Eat a variety of healthy foods  Healthy foods include fruits, vegetables, whole-grain breads, low-fat dairy foods, beans, lean meats, and fish  Drink liquids as directed  Ask how much liquid to drink each day and which liquids are best for you  Limit caffeine to less than 200 milligrams each day  Limit your intake of fish to 2 servings each week  Choose fish low in mercury such as canned light tuna, shrimp, crab, salmon, cod, or tilapia  Do not  eat fish high in mercury such as swordfish, tilefish, lenore mackerel, and shark  · Take prenatal vitamins as directed  Your need for certain vitamins and minerals, such as folic acid, increases during pregnancy  Prenatal vitamins provide some of the extra vitamins and minerals you need  Prenatal vitamins may also help to decrease the risk of certain birth defects  · Ask how much weight you should gain during your pregnancy  Too much or too little weight gain can be unhealthy for you and your baby  · Talk to your healthcare provider about exercise  Moderate exercise can help you stay fit  Your healthcare provider will help you plan an exercise program that is safe for you during pregnancy  · Do not smoke  If you smoke, it is never too late to quit  Smoking increases your risk of a miscarriage and other health problems during your pregnancy  Smoking can cause your baby to be born too early or weigh less at birth  Ask your healthcare provider for information if you need help quitting  · Do not drink alcohol    Alcohol passes from your body to your baby through the placenta  It can affect your baby's brain development and cause fetal alcohol syndrome (FAS)  FAS is a group of conditions that causes mental, behavior, and growth problems  · Talk to your healthcare provider before you take any medicines  Many medicines may harm your baby if you take them when you are pregnant  Do not take any medicines, vitamins, herbs, or supplements without first talking to your healthcare provider  Never use illegal or street drugs (such as marijuana or cocaine) while you are pregnant  Safety tips:   · Avoid hot tubs and saunas  Do not use a hot tub or sauna while you are pregnant, especially during your first trimester  Hot tubs and saunas may raise your baby's temperature and increase the risk of birth defects  · Avoid toxoplasmosis  This is an infection caused by eating raw meat or being around infected cat feces  It can cause birth defects, miscarriages, and other problems  Wash your hands after you touch raw meat  Make sure any meat is well-cooked before you eat it  Avoid raw eggs and unpasteurized milk  Use gloves or ask someone else to clean your cat's litter box while you are pregnant  · Ask your healthcare provider about travel  The most comfortable time to travel is during the second trimester  Ask your healthcare provider if you can travel after 36 weeks  You may not be able to travel in an airplane after 36 weeks  He may also recommend that you avoid long road trips  Follow up with your healthcare provider or obstetrician as directed:  Go to all of your prenatal visits during your pregnancy  Write down your questions so you remember to ask them during your visits  © 2017 [address] Information is for End User's use only and may not be sold, redistributed or otherwise used for commercial purposes  All illustrations and images included in CareNotes® are the copyrighted property of [employer]  or [employer]    The above information is an  only  It is not intended as medical advice for individual conditions or treatments  Talk to your doctor, nurse or pharmacist before following any medical regimen to see if it is safe and effective for you

## 2020-02-14 LAB
C TRACH DNA SPEC QL NAA+PROBE: NEGATIVE
N GONORRHOEA DNA SPEC QL NAA+PROBE: NEGATIVE

## 2020-02-17 LAB
HPV HR 12 DNA CVX QL NAA+PROBE: NEGATIVE
HPV16 DNA CVX QL NAA+PROBE: POSITIVE
HPV18 DNA CVX QL NAA+PROBE: NEGATIVE

## 2020-02-24 LAB
LAB AP GYN PRIMARY INTERPRETATION: ABNORMAL
Lab: ABNORMAL
PATH INTERP SPEC-IMP: ABNORMAL

## 2020-02-27 ENCOUNTER — TELEPHONE (OUTPATIENT)
Dept: OBGYN CLINIC | Facility: CLINIC | Age: 35
End: 2020-02-27

## 2020-02-27 ENCOUNTER — ULTRASOUND (OUTPATIENT)
Dept: PERINATAL CARE | Facility: OTHER | Age: 35
End: 2020-02-27
Payer: COMMERCIAL

## 2020-02-27 VITALS
WEIGHT: 146.2 LBS | SYSTOLIC BLOOD PRESSURE: 109 MMHG | HEIGHT: 64 IN | HEART RATE: 93 BPM | BODY MASS INDEX: 24.96 KG/M2 | DIASTOLIC BLOOD PRESSURE: 70 MMHG

## 2020-02-27 DIAGNOSIS — Z3A.27 27 WEEKS GESTATION OF PREGNANCY: ICD-10-CM

## 2020-02-27 DIAGNOSIS — O99.333 MATERNAL TOBACCO USE IN THIRD TRIMESTER: Primary | ICD-10-CM

## 2020-02-27 PROCEDURE — 4004F PT TOBACCO SCREEN RCVD TLK: CPT | Performed by: OBSTETRICS & GYNECOLOGY

## 2020-02-27 PROCEDURE — 99212 OFFICE O/P EST SF 10 MIN: CPT | Performed by: OBSTETRICS & GYNECOLOGY

## 2020-02-27 PROCEDURE — 76816 OB US FOLLOW-UP PER FETUS: CPT | Performed by: OBSTETRICS & GYNECOLOGY

## 2020-02-27 NOTE — PROGRESS NOTES
The patient was seen today for an ultrasound  Please see ultrasound report (located under Ob Procedures) for additional details  Thank you very much for allowing us to participate in the care of this very nice patient  Should you have any questions, please do not hesitate to contact me  Manjinder Hale MD [id number] [facility]  Attending Physician, [place]

## 2020-02-27 NOTE — TELEPHONE ENCOUNTER
----- Message from Rosalina Umana sent at 2/24/2020  2:25 PM EST -----  Please call pt and let her know that her pap results are positive HPV type 16, ASCUS pap  She will need a colpo, please schedule with a provider who does them  Thanks!

## 2020-02-28 NOTE — TELEPHONE ENCOUNTER
Pt contacted and advised as directed  Pt was nervous so I explained what happens during a colpo appt  Pt was grateful for the information  Pt advised to take ibuprofen 1 hr prior to procedure  Pt agreed to plan and scheduled for 03/12 in [place] for 10:20 arrival 10:05 am  Colpo information packet mailed to pt at home address on file

## 2020-03-02 ENCOUNTER — APPOINTMENT (OUTPATIENT)
Dept: LAB | Facility: HOSPITAL | Age: 35
End: 2020-03-02
Attending: STUDENT IN AN ORGANIZED HEALTH CARE EDUCATION/TRAINING PROGRAM
Payer: COMMERCIAL

## 2020-03-02 DIAGNOSIS — O09.32 LATE PRENATAL CARE AFFECTING PREGNANCY IN SECOND TRIMESTER: ICD-10-CM

## 2020-03-02 DIAGNOSIS — Z34.02 ENCOUNTER FOR SUPERVISION OF NORMAL FIRST PREGNANCY IN SECOND TRIMESTER: ICD-10-CM

## 2020-03-02 LAB
ABO GROUP BLD: NORMAL
AMPHETAMINES SERPL QL SCN: NEGATIVE
BACTERIA UR QL AUTO: ABNORMAL /HPF
BARBITURATES UR QL: NEGATIVE
BASOPHILS # BLD AUTO: 0.04 THOUSANDS/ΜL (ref 0–0.1)
BASOPHILS NFR BLD AUTO: 0 % (ref 0–1)
BENZODIAZ UR QL: NEGATIVE
BILIRUB UR QL STRIP: NEGATIVE
BLD GP AB SCN SERPL QL: NEGATIVE
CLARITY UR: CLEAR
COCAINE UR QL: NEGATIVE
COLOR UR: YELLOW
EOSINOPHIL # BLD AUTO: 0.05 THOUSAND/ΜL (ref 0–0.61)
EOSINOPHIL NFR BLD AUTO: 0 % (ref 0–6)
ERYTHROCYTE [DISTWIDTH] IN BLOOD BY AUTOMATED COUNT: 13.1 % (ref 11.6–15.1)
GLUCOSE 1H P 50 G GLC PO SERPL-MCNC: 77 MG/DL
GLUCOSE UR STRIP-MCNC: NEGATIVE MG/DL
HCT VFR BLD AUTO: 35.5 % (ref 34.8–46.1)
HGB BLD-MCNC: 11.5 G/DL (ref 11.5–15.4)
HGB UR QL STRIP.AUTO: NEGATIVE
IMM GRANULOCYTES # BLD AUTO: 0.06 THOUSAND/UL (ref 0–0.2)
IMM GRANULOCYTES NFR BLD AUTO: 1 % (ref 0–2)
KETONES UR STRIP-MCNC: NEGATIVE MG/DL
LEUKOCYTE ESTERASE UR QL STRIP: NEGATIVE
LYMPHOCYTES # BLD AUTO: 2.57 THOUSANDS/ΜL (ref 0.6–4.47)
LYMPHOCYTES NFR BLD AUTO: 20 % (ref 14–44)
MCH RBC QN AUTO: 32.8 PG (ref 26.8–34.3)
MCHC RBC AUTO-ENTMCNC: 32.4 G/DL (ref 31.4–37.4)
MCV RBC AUTO: 101 FL (ref 82–98)
METHADONE UR QL: NEGATIVE
MONOCYTES # BLD AUTO: 0.72 THOUSAND/ΜL (ref 0.17–1.22)
MONOCYTES NFR BLD AUTO: 6 % (ref 4–12)
NEUTROPHILS # BLD AUTO: 9.47 THOUSANDS/ΜL (ref 1.85–7.62)
NEUTS SEG NFR BLD AUTO: 73 % (ref 43–75)
NITRITE UR QL STRIP: NEGATIVE
NON-SQ EPI CELLS URNS QL MICRO: ABNORMAL /HPF
NRBC BLD AUTO-RTO: 0 /100 WBCS
OPIATES UR QL SCN: NEGATIVE
PCP UR QL: NEGATIVE
PH UR STRIP.AUTO: 6.5 [PH]
PLATELET # BLD AUTO: 347 THOUSANDS/UL (ref 149–390)
PMV BLD AUTO: 9.6 FL (ref 8.9–12.7)
PROT UR STRIP-MCNC: NEGATIVE MG/DL
RBC # BLD AUTO: 3.51 MILLION/UL (ref 3.81–5.12)
RBC #/AREA URNS AUTO: ABNORMAL /HPF
RH BLD: POSITIVE
SP GR UR STRIP.AUTO: 1.02 (ref 1–1.03)
SPECIMEN EXPIRATION DATE: NORMAL
THC UR QL: NEGATIVE
UROBILINOGEN UR QL STRIP.AUTO: 0.2 E.U./DL
WBC # BLD AUTO: 12.91 THOUSAND/UL (ref 4.31–10.16)
WBC #/AREA URNS AUTO: ABNORMAL /HPF

## 2020-03-02 PROCEDURE — 87086 URINE CULTURE/COLONY COUNT: CPT

## 2020-03-02 PROCEDURE — 82950 GLUCOSE TEST: CPT

## 2020-03-02 PROCEDURE — 80307 DRUG TEST PRSMV CHEM ANLYZR: CPT

## 2020-03-02 PROCEDURE — 80081 OBSTETRIC PANEL INC HIV TSTG: CPT

## 2020-03-02 PROCEDURE — 36415 COLL VENOUS BLD VENIPUNCTURE: CPT

## 2020-03-02 PROCEDURE — 81001 URINALYSIS AUTO W/SCOPE: CPT

## 2020-03-03 PROBLEM — R87.610 ATYPICAL SQUAMOUS CELLS OF UNDETERMINED SIGNIFICANCE ON CYTOLOGIC SMEAR OF CERVIX (ASC-US): Status: ACTIVE | Noted: 2020-03-03

## 2020-03-03 LAB
HBV SURFACE AG SER QL: NORMAL
RPR SER QL: NORMAL
RUBV IGG SERPL IA-ACNC: 146 IU/ML

## 2020-03-04 LAB — HIV 1+2 AB+HIV1 P24 AG SERPL QL IA: NORMAL

## 2020-03-05 ENCOUNTER — TELEPHONE (OUTPATIENT)
Dept: OBGYN CLINIC | Facility: CLINIC | Age: 35
End: 2020-03-05

## 2020-03-05 ENCOUNTER — ROUTINE PRENATAL (OUTPATIENT)
Dept: OBGYN CLINIC | Facility: CLINIC | Age: 35
End: 2020-03-05
Payer: COMMERCIAL

## 2020-03-05 VITALS — DIASTOLIC BLOOD PRESSURE: 68 MMHG | WEIGHT: 146.6 LBS | SYSTOLIC BLOOD PRESSURE: 112 MMHG | BODY MASS INDEX: 25.16 KG/M2

## 2020-03-05 DIAGNOSIS — R82.71 BACTERIURIA IN PREGNANCY: Primary | ICD-10-CM

## 2020-03-05 DIAGNOSIS — O99.891 BACTERIURIA IN PREGNANCY: Primary | ICD-10-CM

## 2020-03-05 DIAGNOSIS — Z3A.27 27 WEEKS GESTATION OF PREGNANCY: ICD-10-CM

## 2020-03-05 DIAGNOSIS — Z34.93 PREGNANCY, OBSTETRICAL CARE, THIRD TRIMESTER: Primary | ICD-10-CM

## 2020-03-05 LAB
BACTERIA UR CULT: ABNORMAL
BACTERIA UR CULT: ABNORMAL
SL AMB  POCT GLUCOSE, UA: NORMAL
SL AMB POCT URINE PROTEIN: NORMAL

## 2020-03-05 PROCEDURE — 99213 OFFICE O/P EST LOW 20 MIN: CPT | Performed by: STUDENT IN AN ORGANIZED HEALTH CARE EDUCATION/TRAINING PROGRAM

## 2020-03-05 RX ORDER — AMOXICILLIN 875 MG/1
875 TABLET, COATED ORAL 2 TIMES DAILY
Qty: 10 TABLET | Refills: 0 | Status: SHIPPED | OUTPATIENT
Start: 2020-03-05 | End: 2020-03-10

## 2020-03-05 NOTE — PROGRESS NOTES
Pt c/o lumbar pain, +FM, no ctx/VB/LOF Pt refused influenza vaccination, but TDAP VIS given to patient

## 2020-03-05 NOTE — TELEPHONE ENCOUNTER
Called pt- , per communication consent, L/M informing pt of dr Gina Leon' recommendation to complete  txment , due to bacteria indicating a UTI  Advised to call back with any further questions/concerns

## 2020-03-05 NOTE — ASSESSMENT & PLAN NOTE
31 yo  at 28+0 here for routine OB visit  Feeling well  Having some back pain, using heat therapy with some relief  No contractions, leaking or bleeding  Good fetal movement  Declines flu, considering tdap  Labs up to date  Measures borderline S<D last growth 34% last week planning 6 wk follow up  Return in one week for colpo 2 wks for ob visit

## 2020-03-05 NOTE — TELEPHONE ENCOUNTER
Please let patient know low growth of bacteria indicating early UTI, given pregnancy I recommend she complete treatment

## 2020-03-05 NOTE — PROGRESS NOTES
Encounter for supervision of normal first pregnancy in second trimester  31 yo  at 34+0 here for routine OB visit  Feeling well  Having some back pain, using heat therapy with some relief  No contractions, leaking or bleeding  Good fetal movement  Declines flu, considering tdap  Labs up to date  Measures borderline S<D last growth 34% last week planning 6 wk follow up  Return in one week for colpo 2 wks for ob visit

## 2020-03-12 ENCOUNTER — PROCEDURE VISIT (OUTPATIENT)
Dept: OBGYN CLINIC | Facility: CLINIC | Age: 35
End: 2020-03-12
Payer: COMMERCIAL

## 2020-03-12 VITALS
WEIGHT: 148 LBS | BODY MASS INDEX: 25.27 KG/M2 | DIASTOLIC BLOOD PRESSURE: 72 MMHG | HEIGHT: 64 IN | SYSTOLIC BLOOD PRESSURE: 108 MMHG

## 2020-03-12 DIAGNOSIS — R87.610 ATYPICAL SQUAMOUS CELLS OF UNDETERMINED SIGNIFICANCE ON CYTOLOGIC SMEAR OF CERVIX (ASC-US): ICD-10-CM

## 2020-03-12 DIAGNOSIS — Z34.02 ENCOUNTER FOR SUPERVISION OF NORMAL FIRST PREGNANCY IN SECOND TRIMESTER: Primary | ICD-10-CM

## 2020-03-12 PROCEDURE — 57452 EXAM OF CERVIX W/SCOPE: CPT | Performed by: STUDENT IN AN ORGANIZED HEALTH CARE EDUCATION/TRAINING PROGRAM

## 2020-03-12 NOTE — PROGRESS NOTES
Colposcopy  Date/Time: 3/12/2020 10:48 AM  Performed by: Medardo Almodovar MD  Authorized by: Medardo Almodovar MD     Consent:     Consent obtained:  Verbal and written    Consent given by:  Patient    Procedural risks discussed:  Bleeding, failure rate, infection and damage to other organs    Patient questions answered: yes      Patient agrees, verbalizes understanding, and wants to proceed: yes      Educational handouts given: yes      Instructions and paperwork completed: yes    Indication:     Indication:  ASC-US (16 positive)  Procedure:     Procedure: Colposcopy only      Lorraine speculum was placed in the vagina: yes      Under colposcopic examination the transition zone was seen in entirety: no      Monsel's solution was applied: no      Biopsy(s): no    Post-procedure:     Patient tolerance of procedure: Tolerated well, no immediate complications  Comments:      No acetowhite changes seen  No punctation or changes under green light  TZ not seen in its entirety  Two small nabothian cysts at 6 oclock      * Atypical Squamous Cells of Undetermined Significance On Cytologic Smear of Cervix (Asc-Us)     Status: Active                         2/13/20 ASCUS HPV16+        Colpo 3/12 without biopsies, no changes seen                Plan: repeat colpo 6 wk PP with ECC and likely        random bx

## 2020-03-16 NOTE — TELEPHONE ENCOUNTER
Last O/V: 11/13/2018  Next O/V: 02/19/2019 w/ Dr Negrito Rush   PMED checked, no red flags identified; safe to proceed with prescription refills
No

## 2020-03-19 ENCOUNTER — ROUTINE PRENATAL (OUTPATIENT)
Dept: OBGYN CLINIC | Facility: CLINIC | Age: 35
End: 2020-03-19
Payer: COMMERCIAL

## 2020-03-19 VITALS — WEIGHT: 147 LBS | BODY MASS INDEX: 25.23 KG/M2 | DIASTOLIC BLOOD PRESSURE: 64 MMHG | SYSTOLIC BLOOD PRESSURE: 122 MMHG

## 2020-03-19 DIAGNOSIS — O99.891 BACTERIURIA IN PREGNANCY: ICD-10-CM

## 2020-03-19 DIAGNOSIS — Z72.0 TOBACCO ABUSE: ICD-10-CM

## 2020-03-19 DIAGNOSIS — Z34.03 ENCOUNTER FOR SUPERVISION OF NORMAL FIRST PREGNANCY IN THIRD TRIMESTER: Primary | ICD-10-CM

## 2020-03-19 DIAGNOSIS — Z23 NEED FOR TDAP VACCINATION: ICD-10-CM

## 2020-03-19 DIAGNOSIS — R82.71 BACTERIURIA IN PREGNANCY: ICD-10-CM

## 2020-03-19 PROBLEM — Z3A.27 27 WEEKS GESTATION OF PREGNANCY: Status: RESOLVED | Noted: 2020-01-31 | Resolved: 2020-03-19

## 2020-03-19 PROCEDURE — 87086 URINE CULTURE/COLONY COUNT: CPT | Performed by: NURSE PRACTITIONER

## 2020-03-19 PROCEDURE — 90715 TDAP VACCINE 7 YRS/> IM: CPT

## 2020-03-19 PROCEDURE — 4004F PT TOBACCO SCREEN RCVD TLK: CPT | Performed by: NURSE PRACTITIONER

## 2020-03-19 PROCEDURE — 99213 OFFICE O/P EST LOW 20 MIN: CPT | Performed by: NURSE PRACTITIONER

## 2020-03-19 PROCEDURE — 90471 IMMUNIZATION ADMIN: CPT

## 2020-03-19 NOTE — PROGRESS NOTES
Problem List Items Addressed This Visit        Other    Tobacco abuse      Other Visit Diagnoses     Encounter for supervision of normal first pregnancy in third trimester    -  Primary    Bacteriuria in pregnancy        Need for Tdap vaccination        Relevant Orders    TDAP VACCINE GREATER THAN OR EQUAL TO 6YO IM (Completed)        Feels well  Denies LOF/CTX/VB  No concerns  Discussed fetal kick counting  Tdap today  Down to smoking 2 cigs per day  Declines flu shot  FG appt on 4/16

## 2020-03-19 NOTE — PATIENT INSTRUCTIONS
Pregnancy at [address]:   What changes are happening in my body? You may continue to have symptoms such as shortness of breath, heartburn, contractions, or swelling of your ankles and feet  You may be gaining about 1 pound a week now  How do I care for myself at this stage of my pregnancy? · Eat a variety of healthy foods  Healthy foods include fruits, vegetables, whole-grain breads, low-fat dairy foods, beans, lean meats, and fish  Drink liquids as directed  Ask how much liquid to drink each day and which liquids are best for you  Limit caffeine to less than 200 milligrams each day  Limit your intake of fish to 2 servings each week  Choose fish low in mercury such as canned light tuna, shrimp, salmon, cod, or tilapia  Do not  eat fish high in mercury such as swordfish, tilefish, lenore mackerel, and shark  · Manage heartburn  by eating 4 or 5 small meals each day instead of large meals  Avoid spicy food  · Manage swelling  by lying down and putting your feet up  · Take prenatal vitamins as directed  Your need for certain vitamins and minerals, such as folic acid, increases during pregnancy  Prenatal vitamins provide some of the extra vitamins and minerals you need  Prenatal vitamins may also help to decrease the risk of certain birth defects  · Talk to your healthcare provider about exercise  Moderate exercise can help you stay fit  Your healthcare provider will help you plan an exercise program that is safe for you during pregnancy  · Do not smoke  If you smoke, it is never too late to quit  Smoking increases your risk of a miscarriage and other health problems during your pregnancy  Smoking can cause your baby to be born too early or weigh less at birth  Ask your healthcare provider for information if you need help quitting  · Do not drink alcohol  Alcohol passes from your body to your baby through the placenta   It can affect your baby's brain development and cause fetal alcohol syndrome (FAS)  FAS is a group of conditions that causes mental, behavior, and growth problems  · Talk to your healthcare provider before you take any medicines  Many medicines may harm your baby if you take them when you are pregnant  Do not take any medicines, vitamins, herbs, or supplements without first talking to your healthcare provider  Never use illegal or street drugs (such as marijuana or cocaine) while you are pregnant  What are some safety tips during pregnancy? · Avoid hot tubs and saunas  Do not use a hot tub or sauna while you are pregnant, especially during your first trimester  Hot tubs and saunas may raise your baby's temperature and increase the risk of birth defects  · Avoid toxoplasmosis  This is an infection caused by eating raw meat or being around infected cat feces  It can cause birth defects, miscarriages, and other problems  Wash your hands after you touch raw meat  Make sure any meat is well-cooked before you eat it  Avoid raw eggs and unpasteurized milk  Use gloves or ask someone else to clean your cat's litter box while you are pregnant  What changes are happening with my baby? By 34 weeks, your baby may weigh more than 5 pounds  Your baby will be about 12 ½ inches long from the top of the head to the rump (baby's bottom)  Your baby is gaining about ½ pound a week  Your baby's eyes open and close now  Your baby's kicks and movements are more forceful at this time  What do I need to know about prenatal care? Your healthcare provider will check your blood pressure and weight  You may also need the following:  · A urine test  may also be done to check for sugar and protein  These can be signs of gestational diabetes or infection  Protein in your urine may also be a sign of preeclampsia  Preeclampsia is a condition that can develop during week 20 or later of your pregnancy   It causes high blood pressure, and it can cause problems with your kidneys and other organs  · A Tdap vaccine  may be recommended by your healthcare provider  · Fundal height  is a measurement of your uterus to check your baby's growth  This number is usually the same as the number of weeks that you have been pregnant  Your healthcare provider may also check your baby's position  · Your baby's heart rate  will be checked  When should I seek immediate care? · You develop a severe headache that does not go away  · You have new or increased vision changes, such as blurred or spotted vision  · You have new or increased swelling in your face or hands  · You have vaginal spotting or bleeding  · Your water broke or you feel warm water gushing or trickling from your vagina  When should I contact my healthcare provider? · You have more than 5 contractions in 1 hour  · You notice any changes in your baby's movements  · You have abdominal cramps, pressure, or tightening  · You have a change in vaginal discharge  · You have chills or a fever  · You have vaginal itching, burning, or pain  · You have yellow, green, white, or foul-smelling vaginal discharge  · You have pain or burning when you urinate, less urine than usual, or pink or bloody urine  · You have questions or concerns about your condition or care  CARE AGREEMENT:   You have the right to help plan your care  Learn about your health condition and how it may be treated  Discuss treatment options with your caregivers to decide what care you want to receive  You always have the right to refuse treatment  The above information is an  only  It is not intended as medical advice for individual conditions or treatments  Talk to your doctor, nurse or pharmacist before following any medical regimen to see if it is safe and effective for you    © 2017 [employer] Information is for End User's use only and may not be sold, redistributed or otherwise used for commercial purposes  All illustrations and images included in CareNotes® are the copyrighted property of A D A M , Inc  or [employer]leg[employer] Maddox

## 2020-03-20 LAB — BACTERIA UR CULT: NORMAL

## 2020-04-01 ENCOUNTER — ROUTINE PRENATAL (OUTPATIENT)
Dept: OBGYN CLINIC | Facility: CLINIC | Age: 35
End: 2020-04-01
Payer: COMMERCIAL

## 2020-04-01 VITALS — WEIGHT: 150.2 LBS | BODY MASS INDEX: 25.78 KG/M2

## 2020-04-01 DIAGNOSIS — Z34.03 ENCOUNTER FOR SUPERVISION OF NORMAL FIRST PREGNANCY IN THIRD TRIMESTER: Primary | ICD-10-CM

## 2020-04-01 DIAGNOSIS — O99.333 MATERNAL TOBACCO USE IN THIRD TRIMESTER: ICD-10-CM

## 2020-04-01 LAB
SL AMB  POCT GLUCOSE, UA: NEGATIVE
SL AMB POCT URINE PROTEIN: POSITIVE

## 2020-04-01 PROCEDURE — 99213 OFFICE O/P EST LOW 20 MIN: CPT | Performed by: NURSE PRACTITIONER

## 2020-04-01 RX ORDER — ACETAMINOPHEN 500 MG
500 TABLET ORAL EVERY 6 HOURS PRN
COMMUNITY
End: 2020-06-06 | Stop reason: HOSPADM

## 2020-04-01 RX ORDER — BLOOD PRESSURE TEST KIT
KIT MISCELLANEOUS DAILY
Qty: 1 EACH | Refills: 0 | Status: SHIPPED | OUTPATIENT
Start: 2020-04-01 | End: 2020-05-28

## 2020-04-09 ENCOUNTER — TELEPHONE (OUTPATIENT)
Dept: PERINATAL CARE | Facility: CLINIC | Age: 35
End: 2020-04-09

## 2020-04-16 ENCOUNTER — TELEMEDICINE (OUTPATIENT)
Dept: OBGYN CLINIC | Facility: CLINIC | Age: 35
End: 2020-04-16

## 2020-04-16 DIAGNOSIS — Z34.03 ENCOUNTER FOR SUPERVISION OF NORMAL FIRST PREGNANCY IN THIRD TRIMESTER: Primary | ICD-10-CM

## 2020-04-16 PROCEDURE — ERR1 ERRONEOUS ENCOUNTER-DISREGARD: Performed by: NURSE PRACTITIONER

## 2020-04-17 ENCOUNTER — TELEPHONE (OUTPATIENT)
Dept: OBGYN CLINIC | Facility: CLINIC | Age: 35
End: 2020-04-17

## 2020-04-20 ENCOUNTER — TELEPHONE (OUTPATIENT)
Dept: OBGYN CLINIC | Facility: CLINIC | Age: 35
End: 2020-04-20

## 2020-04-30 ENCOUNTER — TELEPHONE (OUTPATIENT)
Dept: PERINATAL CARE | Facility: OTHER | Age: 35
End: 2020-04-30

## 2020-04-30 ENCOUNTER — ROUTINE PRENATAL (OUTPATIENT)
Dept: OBGYN CLINIC | Facility: CLINIC | Age: 35
End: 2020-04-30
Payer: COMMERCIAL

## 2020-04-30 DIAGNOSIS — Z34.03 ENCOUNTER FOR SUPERVISION OF NORMAL FIRST PREGNANCY IN THIRD TRIMESTER: Primary | ICD-10-CM

## 2020-04-30 LAB
SL AMB  POCT GLUCOSE, UA: NEGATIVE
SL AMB POCT URINE PROTEIN: POSITIVE

## 2020-04-30 PROCEDURE — 87653 STREP B DNA AMP PROBE: CPT | Performed by: NURSE PRACTITIONER

## 2020-04-30 PROCEDURE — 99213 OFFICE O/P EST LOW 20 MIN: CPT | Performed by: NURSE PRACTITIONER

## 2020-05-01 ENCOUNTER — ULTRASOUND (OUTPATIENT)
Dept: PERINATAL CARE | Facility: OTHER | Age: 35
End: 2020-05-01
Payer: COMMERCIAL

## 2020-05-01 VITALS
TEMPERATURE: 98.1 F | HEIGHT: 64 IN | DIASTOLIC BLOOD PRESSURE: 77 MMHG | SYSTOLIC BLOOD PRESSURE: 121 MMHG | WEIGHT: 158 LBS | HEART RATE: 87 BPM | BODY MASS INDEX: 26.98 KG/M2

## 2020-05-01 DIAGNOSIS — Z3A.36 36 WEEKS GESTATION OF PREGNANCY: ICD-10-CM

## 2020-05-01 DIAGNOSIS — O99.333 MATERNAL TOBACCO USE IN THIRD TRIMESTER: ICD-10-CM

## 2020-05-01 PROCEDURE — 59025 FETAL NON-STRESS TEST: CPT | Performed by: OBSTETRICS & GYNECOLOGY

## 2020-05-01 PROCEDURE — 76816 OB US FOLLOW-UP PER FETUS: CPT | Performed by: OBSTETRICS & GYNECOLOGY

## 2020-05-01 PROCEDURE — 76820 UMBILICAL ARTERY ECHO: CPT | Performed by: OBSTETRICS & GYNECOLOGY

## 2020-05-01 PROCEDURE — 76821 MIDDLE CEREBRAL ARTERY ECHO: CPT | Performed by: OBSTETRICS & GYNECOLOGY

## 2020-05-01 PROCEDURE — 4004F PT TOBACCO SCREEN RCVD TLK: CPT | Performed by: OBSTETRICS & GYNECOLOGY

## 2020-05-01 PROCEDURE — 99212 OFFICE O/P EST SF 10 MIN: CPT | Performed by: OBSTETRICS & GYNECOLOGY

## 2020-05-02 LAB — GP B STREP DNA SPEC QL NAA+PROBE: NORMAL

## 2020-05-04 ENCOUNTER — TELEPHONE (OUTPATIENT)
Dept: OBGYN CLINIC | Facility: CLINIC | Age: 35
End: 2020-05-04

## 2020-05-05 ENCOUNTER — TELEPHONE (OUTPATIENT)
Dept: PERINATAL CARE | Facility: CLINIC | Age: 35
End: 2020-05-05

## 2020-05-06 ENCOUNTER — ULTRASOUND (OUTPATIENT)
Dept: PERINATAL CARE | Facility: CLINIC | Age: 35
End: 2020-05-06
Payer: COMMERCIAL

## 2020-05-06 VITALS
HEIGHT: 64 IN | HEART RATE: 87 BPM | SYSTOLIC BLOOD PRESSURE: 124 MMHG | TEMPERATURE: 96.8 F | BODY MASS INDEX: 27.35 KG/M2 | DIASTOLIC BLOOD PRESSURE: 70 MMHG | WEIGHT: 160.2 LBS

## 2020-05-06 DIAGNOSIS — Z3A.36 36 WEEKS GESTATION OF PREGNANCY: Primary | ICD-10-CM

## 2020-05-06 PROCEDURE — 59025 FETAL NON-STRESS TEST: CPT | Performed by: OBSTETRICS & GYNECOLOGY

## 2020-05-06 PROCEDURE — 76815 OB US LIMITED FETUS(S): CPT | Performed by: OBSTETRICS & GYNECOLOGY

## 2020-05-07 ENCOUNTER — TELEMEDICINE (OUTPATIENT)
Dept: OBGYN CLINIC | Facility: CLINIC | Age: 35
End: 2020-05-07
Payer: COMMERCIAL

## 2020-05-07 ENCOUNTER — TELEPHONE (OUTPATIENT)
Dept: OBGYN CLINIC | Facility: CLINIC | Age: 35
End: 2020-05-07

## 2020-05-07 VITALS — BODY MASS INDEX: 27.46 KG/M2 | WEIGHT: 160 LBS

## 2020-05-07 DIAGNOSIS — R87.610 ATYPICAL SQUAMOUS CELLS OF UNDETERMINED SIGNIFICANCE ON CYTOLOGIC SMEAR OF CERVIX (ASC-US): ICD-10-CM

## 2020-05-07 DIAGNOSIS — Z34.03 ENCOUNTER FOR SUPERVISION OF NORMAL FIRST PREGNANCY IN THIRD TRIMESTER: Primary | ICD-10-CM

## 2020-05-07 PROCEDURE — G2012 BRIEF CHECK IN BY MD/QHP: HCPCS | Performed by: STUDENT IN AN ORGANIZED HEALTH CARE EDUCATION/TRAINING PROGRAM

## 2020-05-13 ENCOUNTER — TELEPHONE (OUTPATIENT)
Dept: PERINATAL CARE | Facility: CLINIC | Age: 35
End: 2020-05-13

## 2020-05-14 ENCOUNTER — TELEPHONE (OUTPATIENT)
Dept: OBGYN CLINIC | Facility: CLINIC | Age: 35
End: 2020-05-14

## 2020-05-14 ENCOUNTER — ULTRASOUND (OUTPATIENT)
Dept: PERINATAL CARE | Facility: OTHER | Age: 35
End: 2020-05-14
Payer: COMMERCIAL

## 2020-05-14 VITALS
HEIGHT: 64 IN | DIASTOLIC BLOOD PRESSURE: 74 MMHG | SYSTOLIC BLOOD PRESSURE: 121 MMHG | BODY MASS INDEX: 27.66 KG/M2 | TEMPERATURE: 97.7 F | WEIGHT: 162 LBS | HEART RATE: 85 BPM

## 2020-05-14 DIAGNOSIS — Z3A.38 38 WEEKS GESTATION OF PREGNANCY: ICD-10-CM

## 2020-05-14 DIAGNOSIS — O28.8 NON-REACTIVE NST (NON-STRESS TEST): ICD-10-CM

## 2020-05-14 PROCEDURE — 76820 UMBILICAL ARTERY ECHO: CPT | Performed by: OBSTETRICS & GYNECOLOGY

## 2020-05-14 PROCEDURE — 76815 OB US LIMITED FETUS(S): CPT | Performed by: OBSTETRICS & GYNECOLOGY

## 2020-05-14 PROCEDURE — 76818 FETAL BIOPHYS PROFILE W/NST: CPT | Performed by: OBSTETRICS & GYNECOLOGY

## 2020-05-14 PROCEDURE — 59025 FETAL NON-STRESS TEST: CPT | Performed by: OBSTETRICS & GYNECOLOGY

## 2020-05-14 PROCEDURE — NC001 PR NO CHARGE: Performed by: OBSTETRICS & GYNECOLOGY

## 2020-05-21 ENCOUNTER — TELEPHONE (OUTPATIENT)
Dept: PERINATAL CARE | Facility: CLINIC | Age: 35
End: 2020-05-21

## 2020-05-22 ENCOUNTER — ULTRASOUND (OUTPATIENT)
Dept: PERINATAL CARE | Facility: OTHER | Age: 35
End: 2020-05-22
Payer: COMMERCIAL

## 2020-05-22 VITALS
DIASTOLIC BLOOD PRESSURE: 75 MMHG | HEIGHT: 64 IN | TEMPERATURE: 97.8 F | HEART RATE: 87 BPM | SYSTOLIC BLOOD PRESSURE: 124 MMHG | BODY MASS INDEX: 28.65 KG/M2 | WEIGHT: 167.8 LBS

## 2020-05-22 DIAGNOSIS — Z3A.39 39 WEEKS GESTATION OF PREGNANCY: Primary | ICD-10-CM

## 2020-05-22 DIAGNOSIS — Z34.03 ENCOUNTER FOR SUPERVISION OF NORMAL FIRST PREGNANCY IN THIRD TRIMESTER: ICD-10-CM

## 2020-05-22 DIAGNOSIS — O99.333 MATERNAL TOBACCO USE IN THIRD TRIMESTER: ICD-10-CM

## 2020-05-22 PROCEDURE — 3008F BODY MASS INDEX DOCD: CPT | Performed by: NURSE PRACTITIONER

## 2020-05-22 PROCEDURE — 59025 FETAL NON-STRESS TEST: CPT | Performed by: OBSTETRICS & GYNECOLOGY

## 2020-05-22 PROCEDURE — 76815 OB US LIMITED FETUS(S): CPT | Performed by: OBSTETRICS & GYNECOLOGY

## 2020-05-23 PROBLEM — Z3A.39 39 WEEKS GESTATION OF PREGNANCY: Status: ACTIVE | Noted: 2020-05-14

## 2020-05-28 ENCOUNTER — TELEPHONE (OUTPATIENT)
Dept: PERINATAL CARE | Facility: CLINIC | Age: 35
End: 2020-05-28

## 2020-05-28 ENCOUNTER — ROUTINE PRENATAL (OUTPATIENT)
Dept: OBGYN CLINIC | Facility: CLINIC | Age: 35
End: 2020-05-28
Payer: COMMERCIAL

## 2020-05-28 VITALS
DIASTOLIC BLOOD PRESSURE: 78 MMHG | TEMPERATURE: 98.8 F | SYSTOLIC BLOOD PRESSURE: 122 MMHG | BODY MASS INDEX: 29.01 KG/M2 | WEIGHT: 169 LBS

## 2020-05-28 DIAGNOSIS — Z72.0 TOBACCO ABUSE: ICD-10-CM

## 2020-05-28 DIAGNOSIS — O99.333 MATERNAL TOBACCO USE IN THIRD TRIMESTER: ICD-10-CM

## 2020-05-28 DIAGNOSIS — Z34.03 ENCOUNTER FOR SUPERVISION OF NORMAL FIRST PREGNANCY IN THIRD TRIMESTER: Primary | ICD-10-CM

## 2020-05-28 DIAGNOSIS — Z3A.39 39 WEEKS GESTATION OF PREGNANCY: ICD-10-CM

## 2020-05-28 LAB
SL AMB  POCT GLUCOSE, UA: NEGATIVE
SL AMB POCT URINE PROTEIN: POSITIVE

## 2020-05-28 PROCEDURE — 99213 OFFICE O/P EST LOW 20 MIN: CPT | Performed by: NURSE PRACTITIONER

## 2020-06-01 ENCOUNTER — ROUTINE PRENATAL (OUTPATIENT)
Dept: OBGYN CLINIC | Facility: CLINIC | Age: 35
End: 2020-06-01
Payer: COMMERCIAL

## 2020-06-01 ENCOUNTER — TELEPHONE (OUTPATIENT)
Dept: OBGYN CLINIC | Facility: CLINIC | Age: 35
End: 2020-06-01

## 2020-06-01 VITALS — SYSTOLIC BLOOD PRESSURE: 122 MMHG | WEIGHT: 168 LBS | BODY MASS INDEX: 28.84 KG/M2 | DIASTOLIC BLOOD PRESSURE: 72 MMHG

## 2020-06-01 DIAGNOSIS — Z34.03 ENCOUNTER FOR SUPERVISION OF NORMAL FIRST PREGNANCY IN THIRD TRIMESTER: Primary | ICD-10-CM

## 2020-06-01 DIAGNOSIS — R87.610 ATYPICAL SQUAMOUS CELLS OF UNDETERMINED SIGNIFICANCE ON CYTOLOGIC SMEAR OF CERVIX (ASC-US): ICD-10-CM

## 2020-06-01 DIAGNOSIS — O99.333 MATERNAL TOBACCO USE IN THIRD TRIMESTER: ICD-10-CM

## 2020-06-01 PROBLEM — Z34.02 ENCOUNTER FOR SUPERVISION OF NORMAL FIRST PREGNANCY IN SECOND TRIMESTER: Status: RESOLVED | Noted: 2020-02-13 | Resolved: 2020-06-01

## 2020-06-01 PROBLEM — Z3A.39 39 WEEKS GESTATION OF PREGNANCY: Status: RESOLVED | Noted: 2020-05-14 | Resolved: 2020-06-01

## 2020-06-01 PROCEDURE — 99213 OFFICE O/P EST LOW 20 MIN: CPT | Performed by: NURSE PRACTITIONER

## 2020-06-01 PROCEDURE — 4004F PT TOBACCO SCREEN RCVD TLK: CPT | Performed by: NURSE PRACTITIONER

## 2020-06-03 ENCOUNTER — ANESTHESIA (INPATIENT)
Dept: LABOR AND DELIVERY | Facility: HOSPITAL | Age: 35
DRG: 540 | End: 2020-06-03
Payer: COMMERCIAL

## 2020-06-03 ENCOUNTER — TELEPHONE (OUTPATIENT)
Dept: OBGYN CLINIC | Facility: CLINIC | Age: 35
End: 2020-06-03

## 2020-06-03 ENCOUNTER — TELEPHONE (OUTPATIENT)
Dept: OTHER | Facility: OTHER | Age: 35
End: 2020-06-03

## 2020-06-03 ENCOUNTER — HOSPITAL ENCOUNTER (INPATIENT)
Facility: HOSPITAL | Age: 35
LOS: 3 days | Discharge: HOME | DRG: 540 | End: 2020-06-06
Attending: OBSTETRICS & GYNECOLOGY | Admitting: OBSTETRICS & GYNECOLOGY
Payer: COMMERCIAL

## 2020-06-03 DIAGNOSIS — Z98.891 STATUS POST PRIMARY LOW TRANSVERSE CESAREAN SECTION: Primary | ICD-10-CM

## 2020-06-03 DIAGNOSIS — Z3A.40 40 WEEKS GESTATION OF PREGNANCY: ICD-10-CM

## 2020-06-03 LAB
ABO GROUP BLD: NORMAL
BASE EXCESS BLDCOA CALC-SCNC: -4.2 MMOL/L (ref 3–11)
BASE EXCESS BLDCOV CALC-SCNC: -3.8 MMOL/L (ref 1–9)
BASOPHILS # BLD AUTO: 0.04 THOUSANDS/ΜL (ref 0–0.1)
BASOPHILS NFR BLD AUTO: 0 % (ref 0–1)
BLD GP AB SCN SERPL QL: NEGATIVE
EOSINOPHIL # BLD AUTO: 0.07 THOUSAND/ΜL (ref 0–0.61)
EOSINOPHIL NFR BLD AUTO: 0 % (ref 0–6)
ERYTHROCYTE [DISTWIDTH] IN BLOOD BY AUTOMATED COUNT: 13.1 % (ref 11.6–15.1)
HCO3 BLDCOA-SCNC: 23 MMOL/L (ref 17.3–27.3)
HCO3 BLDCOV-SCNC: 22.2 MMOL/L (ref 12.2–28.6)
HCT VFR BLD AUTO: 39.1 % (ref 34.8–46.1)
HGB BLD-MCNC: 12.8 G/DL (ref 11.5–15.4)
IMM GRANULOCYTES # BLD AUTO: 0.15 THOUSAND/UL (ref 0–0.2)
IMM GRANULOCYTES NFR BLD AUTO: 1 % (ref 0–2)
LYMPHOCYTES # BLD AUTO: 2.46 THOUSANDS/ΜL (ref 0.6–4.47)
LYMPHOCYTES NFR BLD AUTO: 14 % (ref 14–44)
MCH RBC QN AUTO: 33.1 PG (ref 26.8–34.3)
MCHC RBC AUTO-ENTMCNC: 32.7 G/DL (ref 31.4–37.4)
MCV RBC AUTO: 101 FL (ref 82–98)
MONOCYTES # BLD AUTO: 1.06 THOUSAND/ΜL (ref 0.17–1.22)
MONOCYTES NFR BLD AUTO: 6 % (ref 4–12)
NEUTROPHILS # BLD AUTO: 14.31 THOUSANDS/ΜL (ref 1.85–7.62)
NEUTS SEG NFR BLD AUTO: 79 % (ref 43–75)
NRBC BLD AUTO-RTO: 0 /100 WBCS
O2 CT VFR BLDCOA CALC: 4.5 ML/DL
OXYHGB MFR BLDCOA: 20.5 %
OXYHGB MFR BLDCOV: 25.6 %
PCO2 BLDCOA: 49.7 MM[HG] (ref 30–60)
PCO2 BLDCOV: 43.6 MM HG (ref 27–43)
PH BLDCOA: 7.28 [PH] (ref 7.23–7.43)
PH BLDCOV: 7.33 [PH] (ref 7.19–7.49)
PLATELET # BLD AUTO: 326 THOUSANDS/UL (ref 149–390)
PMV BLD AUTO: 9.9 FL (ref 8.9–12.7)
PO2 BLDCOA: 11.8 MM HG (ref 5–25)
PO2 BLDCOV: 14.4 MM HG (ref 15–45)
RBC # BLD AUTO: 3.87 MILLION/UL (ref 3.81–5.12)
RH BLD: POSITIVE
SAO2 % BLDCOV: 5.6 ML/DL
SPECIMEN EXPIRATION DATE: NORMAL
WBC # BLD AUTO: 18.09 THOUSAND/UL (ref 4.31–10.16)

## 2020-06-03 PROCEDURE — 86900 BLOOD TYPING SEROLOGIC ABO: CPT | Performed by: OBSTETRICS & GYNECOLOGY

## 2020-06-03 PROCEDURE — 94664 DEMO&/EVAL PT USE INHALER: CPT

## 2020-06-03 PROCEDURE — 10H07YZ INSERTION OF OTHER DEVICE INTO PRODUCTS OF CONCEPTION, VIA NATURAL OR ARTIFICIAL OPENING: ICD-10-PCS | Performed by: STUDENT IN AN ORGANIZED HEALTH CARE EDUCATION/TRAINING PROGRAM

## 2020-06-03 PROCEDURE — 86592 SYPHILIS TEST NON-TREP QUAL: CPT | Performed by: OBSTETRICS & GYNECOLOGY

## 2020-06-03 PROCEDURE — 10907ZC DRAINAGE OF AMNIOTIC FLUID, THERAPEUTIC FROM PRODUCTS OF CONCEPTION, VIA NATURAL OR ARTIFICIAL OPENING: ICD-10-PCS | Performed by: STUDENT IN AN ORGANIZED HEALTH CARE EDUCATION/TRAINING PROGRAM

## 2020-06-03 PROCEDURE — 86901 BLOOD TYPING SEROLOGIC RH(D): CPT | Performed by: OBSTETRICS & GYNECOLOGY

## 2020-06-03 PROCEDURE — 4A1HXCZ MONITORING OF PRODUCTS OF CONCEPTION, CARDIAC RATE, EXTERNAL APPROACH: ICD-10-PCS | Performed by: STUDENT IN AN ORGANIZED HEALTH CARE EDUCATION/TRAINING PROGRAM

## 2020-06-03 PROCEDURE — 85025 COMPLETE CBC W/AUTO DIFF WBC: CPT | Performed by: OBSTETRICS & GYNECOLOGY

## 2020-06-03 PROCEDURE — 99024 POSTOP FOLLOW-UP VISIT: CPT | Performed by: OBSTETRICS & GYNECOLOGY

## 2020-06-03 PROCEDURE — 85027 COMPLETE CBC AUTOMATED: CPT | Performed by: OBSTETRICS & GYNECOLOGY

## 2020-06-03 PROCEDURE — 59514 CESAREAN DELIVERY ONLY: CPT | Performed by: STUDENT IN AN ORGANIZED HEALTH CARE EDUCATION/TRAINING PROGRAM

## 2020-06-03 PROCEDURE — 10H073Z INSERTION OF MONITORING ELECTRODE INTO PRODUCTS OF CONCEPTION, VIA NATURAL OR ARTIFICIAL OPENING: ICD-10-PCS | Performed by: STUDENT IN AN ORGANIZED HEALTH CARE EDUCATION/TRAINING PROGRAM

## 2020-06-03 PROCEDURE — 82805 BLOOD GASES W/O2 SATURATION: CPT | Performed by: STUDENT IN AN ORGANIZED HEALTH CARE EDUCATION/TRAINING PROGRAM

## 2020-06-03 PROCEDURE — 86850 RBC ANTIBODY SCREEN: CPT | Performed by: OBSTETRICS & GYNECOLOGY

## 2020-06-03 RX ORDER — ONDANSETRON 2 MG/ML
INJECTION INTRAMUSCULAR; INTRAVENOUS AS NEEDED
Status: DISCONTINUED | OUTPATIENT
Start: 2020-06-03 | End: 2020-06-03 | Stop reason: SURG

## 2020-06-03 RX ORDER — HYDROMORPHONE HCL/PF 1 MG/ML
0.2 SYRINGE (ML) INJECTION
Status: DISCONTINUED | OUTPATIENT
Start: 2020-06-03 | End: 2020-06-06 | Stop reason: HOSPADM

## 2020-06-03 RX ORDER — KETOROLAC TROMETHAMINE 30 MG/ML
INJECTION, SOLUTION INTRAMUSCULAR; INTRAVENOUS AS NEEDED
Status: DISCONTINUED | OUTPATIENT
Start: 2020-06-03 | End: 2020-06-03 | Stop reason: SURG

## 2020-06-03 RX ORDER — DOCUSATE SODIUM 100 MG/1
100 CAPSULE, LIQUID FILLED ORAL 2 TIMES DAILY
Status: DISCONTINUED | OUTPATIENT
Start: 2020-06-03 | End: 2020-06-06 | Stop reason: HOSPADM

## 2020-06-03 RX ORDER — LIDOCAINE HYDROCHLORIDE AND EPINEPHRINE 15; 5 MG/ML; UG/ML
INJECTION, SOLUTION EPIDURAL
Status: COMPLETED | OUTPATIENT
Start: 2020-06-03 | End: 2020-06-03

## 2020-06-03 RX ORDER — OXYTOCIN/RINGER'S LACTATE 30/500 ML
62.5 PLASTIC BAG, INJECTION (ML) INTRAVENOUS CONTINUOUS
Status: ACTIVE | OUTPATIENT
Start: 2020-06-03 | End: 2020-06-03

## 2020-06-03 RX ORDER — NALOXONE HYDROCHLORIDE 0.4 MG/ML
0.1 INJECTION, SOLUTION INTRAMUSCULAR; INTRAVENOUS; SUBCUTANEOUS
Status: ACTIVE | OUTPATIENT
Start: 2020-06-03 | End: 2020-06-04

## 2020-06-03 RX ORDER — OXYCODONE HYDROCHLORIDE 5 MG/1
5 TABLET ORAL EVERY 4 HOURS PRN
Status: DISCONTINUED | OUTPATIENT
Start: 2020-06-04 | End: 2020-06-06 | Stop reason: HOSPADM

## 2020-06-03 RX ORDER — CEFAZOLIN SODIUM 2 G/50ML
2000 SOLUTION INTRAVENOUS ONCE
Status: COMPLETED | OUTPATIENT
Start: 2020-06-03 | End: 2020-06-03

## 2020-06-03 RX ORDER — HYDROMORPHONE HCL/PF 1 MG/ML
1 SYRINGE (ML) INJECTION EVERY 2 HOUR PRN
Status: DISCONTINUED | OUTPATIENT
Start: 2020-06-04 | End: 2020-06-06 | Stop reason: HOSPADM

## 2020-06-03 RX ORDER — FENTANYL CITRATE/PF 50 MCG/ML
25 SYRINGE (ML) INJECTION
Status: DISCONTINUED | OUTPATIENT
Start: 2020-06-03 | End: 2020-06-06 | Stop reason: HOSPADM

## 2020-06-03 RX ORDER — SODIUM CHLORIDE, SODIUM LACTATE, POTASSIUM CHLORIDE, CALCIUM CHLORIDE 600; 310; 30; 20 MG/100ML; MG/100ML; MG/100ML; MG/100ML
125 INJECTION, SOLUTION INTRAVENOUS CONTINUOUS
Status: DISCONTINUED | OUTPATIENT
Start: 2020-06-03 | End: 2020-06-05

## 2020-06-03 RX ORDER — CALCIUM CARBONATE 200(500)MG
1000 TABLET,CHEWABLE ORAL DAILY PRN
Status: DISCONTINUED | OUTPATIENT
Start: 2020-06-03 | End: 2020-06-06 | Stop reason: HOSPADM

## 2020-06-03 RX ORDER — ONDANSETRON 2 MG/ML
4 INJECTION INTRAMUSCULAR; INTRAVENOUS EVERY 8 HOURS PRN
Status: DISCONTINUED | OUTPATIENT
Start: 2020-06-04 | End: 2020-06-06 | Stop reason: HOSPADM

## 2020-06-03 RX ORDER — HYDROMORPHONE HCL/PF 1 MG/ML
0.5 SYRINGE (ML) INJECTION EVERY 2 HOUR PRN
Status: DISCONTINUED | OUTPATIENT
Start: 2020-06-03 | End: 2020-06-06 | Stop reason: HOSPADM

## 2020-06-03 RX ORDER — OXYTOCIN/RINGER'S LACTATE 30/500 ML
PLASTIC BAG, INJECTION (ML) INTRAVENOUS CONTINUOUS PRN
Status: DISCONTINUED | OUTPATIENT
Start: 2020-06-03 | End: 2020-06-03 | Stop reason: SURG

## 2020-06-03 RX ORDER — ACETAMINOPHEN 325 MG/1
650 TABLET ORAL EVERY 6 HOURS PRN
Status: DISCONTINUED | OUTPATIENT
Start: 2020-06-03 | End: 2020-06-06 | Stop reason: HOSPADM

## 2020-06-03 RX ORDER — ACETAMINOPHEN 325 MG/1
650 TABLET ORAL EVERY 4 HOURS PRN
Status: DISCONTINUED | OUTPATIENT
Start: 2020-06-03 | End: 2020-06-03

## 2020-06-03 RX ORDER — ONDANSETRON 2 MG/ML
4 INJECTION INTRAMUSCULAR; INTRAVENOUS ONCE AS NEEDED
Status: DISCONTINUED | OUTPATIENT
Start: 2020-06-03 | End: 2020-06-06 | Stop reason: HOSPADM

## 2020-06-03 RX ORDER — MORPHINE SULFATE 0.5 MG/ML
INJECTION, SOLUTION EPIDURAL; INTRATHECAL; INTRAVENOUS AS NEEDED
Status: DISCONTINUED | OUTPATIENT
Start: 2020-06-03 | End: 2020-06-03 | Stop reason: SURG

## 2020-06-03 RX ORDER — PROMETHAZINE HYDROCHLORIDE 25 MG/ML
25 INJECTION, SOLUTION INTRAMUSCULAR; INTRAVENOUS ONCE AS NEEDED
Status: DISCONTINUED | OUTPATIENT
Start: 2020-06-03 | End: 2020-06-06 | Stop reason: HOSPADM

## 2020-06-03 RX ORDER — ONDANSETRON 2 MG/ML
4 INJECTION INTRAMUSCULAR; INTRAVENOUS EVERY 6 HOURS PRN
Status: DISCONTINUED | OUTPATIENT
Start: 2020-06-03 | End: 2020-06-03

## 2020-06-03 RX ORDER — METOCLOPRAMIDE HYDROCHLORIDE 5 MG/ML
5 INJECTION INTRAMUSCULAR; INTRAVENOUS EVERY 6 HOURS PRN
Status: ACTIVE | OUTPATIENT
Start: 2020-06-03 | End: 2020-06-04

## 2020-06-03 RX ORDER — DIPHENHYDRAMINE HCL 25 MG
25 TABLET ORAL DAILY
Status: DISCONTINUED | OUTPATIENT
Start: 2020-06-03 | End: 2020-06-06 | Stop reason: HOSPADM

## 2020-06-03 RX ORDER — NICOTINE 21 MG/24HR
14 PATCH, TRANSDERMAL 24 HOURS TRANSDERMAL DAILY
Status: DISCONTINUED | OUTPATIENT
Start: 2020-06-03 | End: 2020-06-06 | Stop reason: HOSPADM

## 2020-06-03 RX ORDER — DEXAMETHASONE SODIUM PHOSPHATE 4 MG/ML
8 INJECTION, SOLUTION INTRA-ARTICULAR; INTRALESIONAL; INTRAMUSCULAR; INTRAVENOUS; SOFT TISSUE ONCE AS NEEDED
Status: ACTIVE | OUTPATIENT
Start: 2020-06-03 | End: 2020-06-04

## 2020-06-03 RX ORDER — ONDANSETRON 2 MG/ML
4 INJECTION INTRAMUSCULAR; INTRAVENOUS EVERY 4 HOURS PRN
Status: ACTIVE | OUTPATIENT
Start: 2020-06-03 | End: 2020-06-04

## 2020-06-03 RX ORDER — ACETAMINOPHEN 325 MG/1
650 TABLET ORAL EVERY 6 HOURS
Status: DISCONTINUED | OUTPATIENT
Start: 2020-06-03 | End: 2020-06-06 | Stop reason: HOSPADM

## 2020-06-03 RX ORDER — FENTANYL CITRATE 50 UG/ML
INJECTION, SOLUTION INTRAMUSCULAR; INTRAVENOUS AS NEEDED
Status: DISCONTINUED | OUTPATIENT
Start: 2020-06-03 | End: 2020-06-03 | Stop reason: SURG

## 2020-06-03 RX ORDER — IBUPROFEN 600 MG/1
600 TABLET ORAL EVERY 6 HOURS PRN
Status: DISCONTINUED | OUTPATIENT
Start: 2020-06-07 | End: 2020-06-05

## 2020-06-03 RX ORDER — OXYCODONE HYDROCHLORIDE 10 MG/1
10 TABLET ORAL EVERY 4 HOURS PRN
Status: DISCONTINUED | OUTPATIENT
Start: 2020-06-04 | End: 2020-06-06 | Stop reason: HOSPADM

## 2020-06-03 RX ORDER — NICOTINE 21 MG/24HR
14 PATCH, TRANSDERMAL 24 HOURS TRANSDERMAL DAILY
Status: DISCONTINUED | OUTPATIENT
Start: 2020-06-04 | End: 2020-06-03

## 2020-06-03 RX ORDER — DEXAMETHASONE SODIUM PHOSPHATE 4 MG/ML
INJECTION, SOLUTION INTRA-ARTICULAR; INTRALESIONAL; INTRAMUSCULAR; INTRAVENOUS; SOFT TISSUE AS NEEDED
Status: DISCONTINUED | OUTPATIENT
Start: 2020-06-03 | End: 2020-06-03 | Stop reason: SURG

## 2020-06-03 RX ORDER — DIPHENHYDRAMINE HYDROCHLORIDE 50 MG/ML
12.5 INJECTION INTRAMUSCULAR; INTRAVENOUS ONCE AS NEEDED
Status: DISCONTINUED | OUTPATIENT
Start: 2020-06-03 | End: 2020-06-06 | Stop reason: HOSPADM

## 2020-06-03 RX ORDER — ROPIVACAINE HYDROCHLORIDE 2 MG/ML
INJECTION, SOLUTION EPIDURAL; INFILTRATION; PERINEURAL AS NEEDED
Status: DISCONTINUED | OUTPATIENT
Start: 2020-06-03 | End: 2020-06-03 | Stop reason: SURG

## 2020-06-03 RX ORDER — LIDOCAINE HYDROCHLORIDE AND EPINEPHRINE 20; 5 MG/ML; UG/ML
INJECTION, SOLUTION EPIDURAL; INFILTRATION; INTRACAUDAL; PERINEURAL AS NEEDED
Status: DISCONTINUED | OUTPATIENT
Start: 2020-06-03 | End: 2020-06-03 | Stop reason: SURG

## 2020-06-03 RX ORDER — KETOROLAC TROMETHAMINE 30 MG/ML
15 INJECTION, SOLUTION INTRAMUSCULAR; INTRAVENOUS EVERY 6 HOURS
Status: DISCONTINUED | OUTPATIENT
Start: 2020-06-03 | End: 2020-06-05

## 2020-06-03 RX ORDER — DIAPER,BRIEF,INFANT-TODD,DISP
1 EACH MISCELLANEOUS DAILY PRN
Status: DISCONTINUED | OUTPATIENT
Start: 2020-06-03 | End: 2020-06-06 | Stop reason: HOSPADM

## 2020-06-03 RX ADMIN — ROPIVACAINE HYDROCHLORIDE 4 ML: 2 INJECTION, SOLUTION EPIDURAL; INFILTRATION at 08:28

## 2020-06-03 RX ADMIN — LIDOCAINE HYDROCHLORIDE AND EPINEPHRINE 5 ML: 20; 5 INJECTION, SOLUTION EPIDURAL; INFILTRATION; INTRACAUDAL; PERINEURAL at 11:21

## 2020-06-03 RX ADMIN — ROPIVACAINE HYDROCHLORIDE 4 ML: 2 INJECTION, SOLUTION EPIDURAL; INFILTRATION at 08:31

## 2020-06-03 RX ADMIN — LIDOCAINE HYDROCHLORIDE AND EPINEPHRINE 3 ML: 15; 5 INJECTION, SOLUTION EPIDURAL at 08:22

## 2020-06-03 RX ADMIN — LIDOCAINE HYDROCHLORIDE AND EPINEPHRINE 5 ML: 20; 5 INJECTION, SOLUTION EPIDURAL; INFILTRATION; INTRACAUDAL; PERINEURAL at 11:17

## 2020-06-03 RX ADMIN — HYDROMORPHONE HYDROCHLORIDE 0.5 MG: 1 INJECTION, SOLUTION INTRAMUSCULAR; INTRAVENOUS; SUBCUTANEOUS at 20:22

## 2020-06-03 RX ADMIN — SODIUM CHLORIDE, SODIUM LACTATE, POTASSIUM CHLORIDE, AND CALCIUM CHLORIDE 999 ML/HR: .6; .31; .03; .02 INJECTION, SOLUTION INTRAVENOUS at 06:38

## 2020-06-03 RX ADMIN — HYDROMORPHONE HYDROCHLORIDE 0.5 MG: 1 INJECTION, SOLUTION INTRAMUSCULAR; INTRAVENOUS; SUBCUTANEOUS at 15:53

## 2020-06-03 RX ADMIN — LIDOCAINE HYDROCHLORIDE AND EPINEPHRINE 5 ML: 20; 5 INJECTION, SOLUTION EPIDURAL; INFILTRATION; INTRACAUDAL; PERINEURAL at 11:13

## 2020-06-03 RX ADMIN — SODIUM CHLORIDE, SODIUM LACTATE, POTASSIUM CHLORIDE, AND CALCIUM CHLORIDE: .6; .31; .03; .02 INJECTION, SOLUTION INTRAVENOUS at 12:11

## 2020-06-03 RX ADMIN — SODIUM CHLORIDE, SODIUM LACTATE, POTASSIUM CHLORIDE, AND CALCIUM CHLORIDE 999 ML/HR: .6; .31; .03; .02 INJECTION, SOLUTION INTRAVENOUS at 07:42

## 2020-06-03 RX ADMIN — ACETAMINOPHEN 650 MG: 325 TABLET, FILM COATED ORAL at 22:44

## 2020-06-03 RX ADMIN — DOCUSATE SODIUM 100 MG: 100 CAPSULE, LIQUID FILLED ORAL at 18:00

## 2020-06-03 RX ADMIN — Medication 62.5 MILLI-UNITS/MIN: at 13:49

## 2020-06-03 RX ADMIN — KETOROLAC TROMETHAMINE 15 MG: 30 INJECTION, SOLUTION INTRAMUSCULAR at 17:59

## 2020-06-03 RX ADMIN — ONDANSETRON 4 MG: 2 INJECTION INTRAMUSCULAR; INTRAVENOUS at 11:23

## 2020-06-03 RX ADMIN — LIDOCAINE HYDROCHLORIDE AND EPINEPHRINE 5 ML: 20; 5 INJECTION, SOLUTION EPIDURAL; INFILTRATION; INTRACAUDAL; PERINEURAL at 11:24

## 2020-06-03 RX ADMIN — ROPIVACAINE HYDROCHLORIDE: 2 INJECTION, SOLUTION EPIDURAL; INFILTRATION at 08:30

## 2020-06-03 RX ADMIN — DEXAMETHASONE SODIUM PHOSPHATE 4 MG: 4 INJECTION, SOLUTION INTRAMUSCULAR; INTRAVENOUS at 11:45

## 2020-06-03 RX ADMIN — KETOROLAC TROMETHAMINE 15 MG: 30 INJECTION, SOLUTION INTRAMUSCULAR at 12:15

## 2020-06-03 RX ADMIN — FENTANYL CITRATE 100 MCG: 50 INJECTION INTRAMUSCULAR; INTRAVENOUS at 11:24

## 2020-06-03 RX ADMIN — ACETAMINOPHEN 650 MG: 325 TABLET, FILM COATED ORAL at 15:49

## 2020-06-03 RX ADMIN — NICOTINE 14 MG: 14 PATCH TRANSDERMAL at 22:44

## 2020-06-03 RX ADMIN — Medication 250 MILLI-UNITS/MIN: at 11:39

## 2020-06-03 RX ADMIN — SODIUM CHLORIDE, SODIUM LACTATE, POTASSIUM CHLORIDE, AND CALCIUM CHLORIDE: .6; .31; .03; .02 INJECTION, SOLUTION INTRAVENOUS at 11:18

## 2020-06-03 RX ADMIN — SODIUM CHLORIDE, SODIUM LACTATE, POTASSIUM CHLORIDE, AND CALCIUM CHLORIDE 125 ML/HR: .6; .31; .03; .02 INJECTION, SOLUTION INTRAVENOUS at 15:52

## 2020-06-03 RX ADMIN — MORPHINE SULFATE 3 MG: 0.5 INJECTION, SOLUTION EPIDURAL; INTRATHECAL; INTRAVENOUS at 11:46

## 2020-06-03 RX ADMIN — CEFAZOLIN SODIUM 2000 MG: 2 SOLUTION INTRAVENOUS at 11:20

## 2020-06-03 RX ADMIN — SODIUM CHLORIDE, SODIUM LACTATE, POTASSIUM CHLORIDE, AND CALCIUM CHLORIDE 999 ML/HR: .6; .31; .03; .02 INJECTION, SOLUTION INTRAVENOUS at 08:41

## 2020-06-03 RX ADMIN — Medication 500 MG: at 11:23

## 2020-06-03 RX ADMIN — LIDOCAINE HYDROCHLORIDE AND EPINEPHRINE 5 ML: 20; 5 INJECTION, SOLUTION EPIDURAL; INFILTRATION; INTRACAUDAL; PERINEURAL at 12:14

## 2020-06-03 RX ADMIN — ACETAMINOPHEN 650 MG: 325 TABLET, FILM COATED ORAL at 07:33

## 2020-06-04 LAB
ERYTHROCYTE [DISTWIDTH] IN BLOOD BY AUTOMATED COUNT: 12.9 % (ref 11.6–15.1)
HCT VFR BLD AUTO: 28.9 % (ref 34.8–46.1)
HGB BLD-MCNC: 9.6 G/DL (ref 11.5–15.4)
MCH RBC QN AUTO: 33.3 PG (ref 26.8–34.3)
MCHC RBC AUTO-ENTMCNC: 33.2 G/DL (ref 31.4–37.4)
MCV RBC AUTO: 100 FL (ref 82–98)
PLATELET # BLD AUTO: 222 THOUSANDS/UL (ref 149–390)
PMV BLD AUTO: 9.7 FL (ref 8.9–12.7)
RBC # BLD AUTO: 2.88 MILLION/UL (ref 3.81–5.12)
RPR SER QL: NORMAL
WBC # BLD AUTO: 19.78 THOUSAND/UL (ref 4.31–10.16)

## 2020-06-04 PROCEDURE — 96372 THER/PROPH/DIAG INJ SC/IM: CPT

## 2020-06-04 PROCEDURE — 99024 POSTOP FOLLOW-UP VISIT: CPT | Performed by: STUDENT IN AN ORGANIZED HEALTH CARE EDUCATION/TRAINING PROGRAM

## 2020-06-04 PROCEDURE — 99214 OFFICE O/P EST MOD 30 MIN: CPT

## 2020-06-04 RX ORDER — OXYCODONE HYDROCHLORIDE 5 MG/1
5 TABLET ORAL EVERY 4 HOURS PRN
Status: DISCONTINUED | OUTPATIENT
Start: 2020-06-04 | End: 2020-06-04 | Stop reason: SDUPTHER

## 2020-06-04 RX ORDER — SIMETHICONE 80 MG
80 TABLET,CHEWABLE ORAL EVERY 6 HOURS PRN
Status: DISCONTINUED | OUTPATIENT
Start: 2020-06-04 | End: 2020-06-06 | Stop reason: HOSPADM

## 2020-06-04 RX ADMIN — ACETAMINOPHEN 650 MG: 325 TABLET, FILM COATED ORAL at 09:53

## 2020-06-04 RX ADMIN — ACETAMINOPHEN 650 MG: 325 TABLET, FILM COATED ORAL at 18:37

## 2020-06-04 RX ADMIN — NICOTINE 14 MG: 14 PATCH TRANSDERMAL at 22:06

## 2020-06-04 RX ADMIN — DOCUSATE SODIUM 100 MG: 100 CAPSULE, LIQUID FILLED ORAL at 09:53

## 2020-06-04 RX ADMIN — KETOROLAC TROMETHAMINE 15 MG: 30 INJECTION, SOLUTION INTRAMUSCULAR at 06:06

## 2020-06-04 RX ADMIN — HYDROMORPHONE HYDROCHLORIDE 0.5 MG: 1 INJECTION, SOLUTION INTRAMUSCULAR; INTRAVENOUS; SUBCUTANEOUS at 04:46

## 2020-06-04 RX ADMIN — KETOROLAC TROMETHAMINE 15 MG: 30 INJECTION, SOLUTION INTRAMUSCULAR at 00:02

## 2020-06-04 RX ADMIN — SIMETHICONE CHEW TAB 80 MG 80 MG: 80 TABLET ORAL at 15:18

## 2020-06-04 RX ADMIN — OXYCODONE HYDROCHLORIDE 5 MG: 5 TABLET ORAL at 20:01

## 2020-06-04 RX ADMIN — DOCUSATE SODIUM 100 MG: 100 CAPSULE, LIQUID FILLED ORAL at 18:38

## 2020-06-04 RX ADMIN — KETOROLAC TROMETHAMINE 15 MG: 30 INJECTION, SOLUTION INTRAMUSCULAR at 11:40

## 2020-06-04 RX ADMIN — KETOROLAC TROMETHAMINE 15 MG: 30 INJECTION, SOLUTION INTRAMUSCULAR at 20:01

## 2020-06-04 RX ADMIN — ACETAMINOPHEN 650 MG: 325 TABLET, FILM COATED ORAL at 04:46

## 2020-06-04 RX ADMIN — SODIUM CHLORIDE, SODIUM LACTATE, POTASSIUM CHLORIDE, AND CALCIUM CHLORIDE 125 ML/HR: .6; .31; .03; .02 INJECTION, SOLUTION INTRAVENOUS at 00:03

## 2020-06-04 RX ADMIN — OXYCODONE HYDROCHLORIDE 5 MG: 5 TABLET ORAL at 14:59

## 2020-06-05 PROCEDURE — 99024 POSTOP FOLLOW-UP VISIT: CPT | Performed by: STUDENT IN AN ORGANIZED HEALTH CARE EDUCATION/TRAINING PROGRAM

## 2020-06-05 RX ORDER — IBUPROFEN 600 MG/1
600 TABLET ORAL EVERY 6 HOURS SCHEDULED
Status: DISCONTINUED | OUTPATIENT
Start: 2020-06-05 | End: 2020-06-06 | Stop reason: HOSPADM

## 2020-06-05 RX ORDER — LIDOCAINE 50 MG/G
1 PATCH TOPICAL DAILY
Status: DISCONTINUED | OUTPATIENT
Start: 2020-06-05 | End: 2020-06-06 | Stop reason: HOSPADM

## 2020-06-05 RX ADMIN — ACETAMINOPHEN 650 MG: 325 TABLET, FILM COATED ORAL at 12:53

## 2020-06-05 RX ADMIN — OXYCODONE HYDROCHLORIDE 10 MG: 10 TABLET ORAL at 05:08

## 2020-06-05 RX ADMIN — NICOTINE 14 MG: 14 PATCH TRANSDERMAL at 23:18

## 2020-06-05 RX ADMIN — LIDOCAINE 1 PATCH: 50 PATCH TOPICAL at 15:08

## 2020-06-05 RX ADMIN — OXYCODONE HYDROCHLORIDE 10 MG: 10 TABLET ORAL at 12:53

## 2020-06-05 RX ADMIN — OXYCODONE HYDROCHLORIDE 5 MG: 5 TABLET ORAL at 00:48

## 2020-06-05 RX ADMIN — IBUPROFEN 600 MG: 600 TABLET ORAL at 16:44

## 2020-06-05 RX ADMIN — DOCUSATE SODIUM 100 MG: 100 CAPSULE, LIQUID FILLED ORAL at 09:27

## 2020-06-05 RX ADMIN — KETOROLAC TROMETHAMINE 15 MG: 30 INJECTION, SOLUTION INTRAMUSCULAR at 04:12

## 2020-06-05 RX ADMIN — ACETAMINOPHEN 650 MG: 325 TABLET, FILM COATED ORAL at 06:21

## 2020-06-05 RX ADMIN — IBUPROFEN 600 MG: 600 TABLET ORAL at 09:27

## 2020-06-05 RX ADMIN — ACETAMINOPHEN 650 MG: 325 TABLET, FILM COATED ORAL at 00:48

## 2020-06-05 RX ADMIN — OXYCODONE HYDROCHLORIDE 10 MG: 10 TABLET ORAL at 22:16

## 2020-06-05 RX ADMIN — HYDROMORPHONE HYDROCHLORIDE 0.5 MG: 1 INJECTION, SOLUTION INTRAMUSCULAR; INTRAVENOUS; SUBCUTANEOUS at 07:46

## 2020-06-06 VITALS
OXYGEN SATURATION: 98 % | HEART RATE: 86 BPM | SYSTOLIC BLOOD PRESSURE: 122 MMHG | DIASTOLIC BLOOD PRESSURE: 79 MMHG | WEIGHT: 168 LBS | BODY MASS INDEX: 28.68 KG/M2 | HEIGHT: 64 IN | TEMPERATURE: 98.4 F | RESPIRATION RATE: 20 BRPM

## 2020-06-06 PROCEDURE — 99024 POSTOP FOLLOW-UP VISIT: CPT | Performed by: OBSTETRICS & GYNECOLOGY

## 2020-06-06 RX ORDER — DOCUSATE SODIUM 100 MG/1
100 CAPSULE, LIQUID FILLED ORAL 2 TIMES DAILY
Qty: 10 CAPSULE | Refills: 0
Start: 2020-06-06 | End: 2021-08-10 | Stop reason: ALTCHOICE

## 2020-06-06 RX ORDER — OXYCODONE HYDROCHLORIDE 5 MG/1
5 TABLET ORAL EVERY 4 HOURS PRN
Qty: 10 TABLET | Refills: 0 | Status: SHIPPED | OUTPATIENT
Start: 2020-06-06 | End: 2020-06-16

## 2020-06-06 RX ORDER — IBUPROFEN 600 MG/1
600 TABLET ORAL EVERY 6 HOURS SCHEDULED
Qty: 30 TABLET | Refills: 0
Start: 2020-06-06 | End: 2021-08-10 | Stop reason: ALTCHOICE

## 2020-06-06 RX ORDER — ACETAMINOPHEN 325 MG/1
650 TABLET ORAL EVERY 6 HOURS PRN
Qty: 30 TABLET | Refills: 0
Start: 2020-06-06 | End: 2021-08-10 | Stop reason: ALTCHOICE

## 2020-06-06 RX ADMIN — OXYCODONE HYDROCHLORIDE 10 MG: 10 TABLET ORAL at 07:36

## 2020-06-06 RX ADMIN — DOCUSATE SODIUM 100 MG: 100 CAPSULE, LIQUID FILLED ORAL at 07:36

## 2020-06-06 RX ADMIN — LIDOCAINE 1 PATCH: 50 PATCH TOPICAL at 10:18

## 2020-06-06 RX ADMIN — IBUPROFEN 600 MG: 600 TABLET ORAL at 01:10

## 2020-06-06 RX ADMIN — IBUPROFEN 600 MG: 600 TABLET ORAL at 10:17

## 2020-06-10 LAB — PLACENTA IN STORAGE: NORMAL

## 2020-06-26 ENCOUNTER — POSTPARTUM VISIT (OUTPATIENT)
Dept: OBGYN CLINIC | Facility: CLINIC | Age: 35
End: 2020-06-26

## 2020-06-26 VITALS
WEIGHT: 144.6 LBS | SYSTOLIC BLOOD PRESSURE: 104 MMHG | TEMPERATURE: 98.8 F | BODY MASS INDEX: 24.82 KG/M2 | DIASTOLIC BLOOD PRESSURE: 70 MMHG

## 2020-06-26 PROBLEM — Z98.891 S/P REPEAT LOW TRANSVERSE C-SECTION: Status: ACTIVE | Noted: 2020-06-26

## 2020-06-26 PROCEDURE — 1111F DSCHRG MED/CURRENT MED MERGE: CPT | Performed by: NURSE PRACTITIONER

## 2020-06-26 PROCEDURE — PNV: Performed by: NURSE PRACTITIONER

## 2020-08-17 ENCOUNTER — TELEPHONE (OUTPATIENT)
Dept: OBGYN CLINIC | Facility: CLINIC | Age: 35
End: 2020-08-17

## 2020-08-17 NOTE — TELEPHONE ENCOUNTER
----- Message from Jane Dan sent at 8/17/2020 12:30 PM EDT -----  Regarding: Non-Urgent Medical Question  Contact: [phone]  I did call and leave a message at the [facility] office but I just wanted to make sure someone got the message  I will be returning to work on September 1, 2020 and will be needing a Dr note  If you could mail it to my address I would greatly appreciate it     [address] [facility]     Thank You,  Jane Dan

## 2020-08-17 NOTE — LETTER
August 17, 2020     Patient: Reuben Garner   YOB: 1985   Date of Visit: 8/17/2020       To Whom It May Concern: It is my medical opinion that Ms Phoenix Perla may return to Work on 9/1/2020 without restrictions  If you have any questions or concerns, please don't hesitate to call           Sincerely,        Rhonda Latham MD      CC: No Recipients

## 2020-08-17 NOTE — TELEPHONE ENCOUNTER
----- Message from Federica Boudreaux sent at 8/15/2020  9:27 PM EDT -----  Regarding: Non-Urgent Medical Question  Contact: [phone]  I forgot to ask for a note to return to work in September  Can one be sent to me or do I need to make another appointment to receive one?      Thank Brittany Aviles

## 2020-12-31 NOTE — ADDENDUM NOTE
Addended by: Alison Niño on: 3/19/2020 09:12 AM     Modules accepted: Bart <--- Click to Launch ICDx for PreOp, PostOp and Procedure

## 2021-04-19 ENCOUNTER — TELEPHONE (OUTPATIENT)
Dept: FAMILY MEDICINE CLINIC | Facility: CLINIC | Age: 36
End: 2021-04-19

## 2021-06-19 NOTE — PATIENT INSTRUCTIONS
Discussed all with patient  We cannot continue to dispense the amount of Klonopin you have been on but will cut the Klonopin to 1 mg twice daily and start mirtazapine 30 mg before bed  This will make you drowsy but after about 2 weeks this should really help with your anxiety  Next month will cut the Klonopin to 1 mg daily and the next month after that will be off the Klonopin  For now, have the labs done I will call with results and we will follow you here in about 6 weeks  Use the Klonopin sparingly for now and if you could skip a dose skip a dose  Will follow here in 6 weeks  Discussed with pt the list of meds retrieved from the state  Letter by Leelee Mendoza RDCS at      Author: Leelee Mendoza RDCS Service: -- Author Type: --    Filed:  Encounter Date: 6/10/2019 Status: (Other)         Levy Alba  [address] [facility] [address]      Bryanna 10, 2019      Dear Mr. Alba,    RE: Remote Results    We are writing to you regarding your recent Remote ICD check from home. Your transmission was received successfully. Battery status is satisfactory at this time.     Your results are showing no significant changes.    Your next device appointment will be a remote check on September 11, 2019; this will occur automatically.    To schedule or reschedule, please call [phone] and press 1.    NOTE: If you would like to do an extra transmission, please call [phone] and press 3 to speak to a nurse BEFORE transmitting. This ensures that the Device Clinic staff is aware of the reason you are sending a transmission, and can follow-up with you after it has been reviewed.    We will be checking your implanted device from home (remotely) every three months unless otherwise instructed. We will need to see you in the clinic at least once a year. You may need to be seen in the clinic sooner depending on the results of your check.    Please be aware:    The follow-up schedule is like a Physician prescription.    Your remote monitor is paired to your specific implanted device.      Sincerely,    [facility]

## 2021-08-10 ENCOUNTER — HOSPITAL ENCOUNTER (OUTPATIENT)
Dept: RADIOLOGY | Facility: HOSPITAL | Age: 36
Discharge: HOME | End: 2021-08-10
Payer: COMMERCIAL

## 2021-08-10 ENCOUNTER — OFFICE VISIT (OUTPATIENT)
Dept: FAMILY MEDICINE CLINIC | Facility: CLINIC | Age: 36
End: 2021-08-10
Payer: COMMERCIAL

## 2021-08-10 VITALS
RESPIRATION RATE: 18 BRPM | HEART RATE: 86 BPM | BODY MASS INDEX: 20.32 KG/M2 | SYSTOLIC BLOOD PRESSURE: 119 MMHG | HEIGHT: 64 IN | WEIGHT: 119 LBS | OXYGEN SATURATION: 98 % | DIASTOLIC BLOOD PRESSURE: 66 MMHG

## 2021-08-10 DIAGNOSIS — M79.672 LEFT FOOT PAIN: Primary | ICD-10-CM

## 2021-08-10 DIAGNOSIS — M79.672 LEFT FOOT PAIN: ICD-10-CM

## 2021-08-10 PROCEDURE — 73630 X-RAY EXAM OF FOOT: CPT

## 2021-08-10 PROCEDURE — 99214 OFFICE O/P EST MOD 30 MIN: CPT | Performed by: FAMILY MEDICINE

## 2021-08-10 NOTE — PATIENT INSTRUCTIONS
Will get xrays but suspect they may be normal  Will refer to Dr Darlene Melara  Can try to use voltaren gel 2 inches to the painful area  Would prefer not to treat with gabapentin, Please got your covid shot

## 2021-08-10 NOTE — PROGRESS NOTES
Assessment/Plan:     Chronic Problems:  No problem-specific Assessment & Plan notes found for this encounter  Visit Diagnosis:  Diagnoses and all orders for this visit:    Left foot pain - doubt plantar fasciitis  Comments: Will get xray of the left foot as this has been since December  Will refer to Dr Camilo Liu for evaluation  Given instructions for stretching  Orders:  -     XR foot 3+ vw left; Future  -     Ambulatory referral to Podiatry; Future  -     Diclofenac Sodium (VOLTAREN) 1 %; Apply 2 g topically 4 (four) times a day          Subjective:    Patient ID: Josephine Castro is a 31 y o  female  Pt is here for pain in the left foot since last Emanuel  Not sure but may have dropped a box on the foot at work  Foot is not swollen and no visual problems  Can't even wear flip flops  Pain is mid foot and feels like there is a hot knife in the bone  Takes ibuprofen but no change in the pains  Had to take time off work  Pain is in the instep and radiates from the heel to the great toe  Stopped all her meds when she got pregant  She is now 14 months  The following portions of the patient's history were reviewed and updated as appropriate: allergies, current medications, past family history, past medical history, past social history, past surgical history and problem list     Review of Systems   Constitutional: Negative for chills, diaphoresis, fatigue and fever  HENT: Negative  Eyes: Negative  Respiratory: Negative for cough, shortness of breath and wheezing  Cardiovascular: Negative for chest pain and palpitations  Gastrointestinal: Negative  Genitourinary: Negative  FDLMP - about 2 weeks ago  Not using birth control but not sexually active  Musculoskeletal: Positive for arthralgias ( Pain in the left foot  )  Neurological: Negative for dizziness, light-headedness and headaches  Psychiatric/Behavioral: Negative for dysphoric mood   The patient is nervous/anxious (always has anxiety but doing well off meds  )  /66   Pulse 86   Resp 18   Ht 5' 4" (1 626 m)   Wt 54 kg (119 lb)   SpO2 98%   BMI 20 43 kg/m²   Social History     Socioeconomic History    Marital status: Single     Spouse name: Not on file    Number of children: Not on file    Years of education: Not on file    Highest education level: Not on file   Occupational History    Not on file   Tobacco Use    Smoking status: Former Smoker     Start date: 2/13/1999    Smokeless tobacco: Never Used    Tobacco comment: was >1ppd, as of 2/13/20 4  a day    Vaping Use    Vaping Use: Never used   Substance and Sexual Activity    Alcohol use: Not Currently    Drug use: Never    Sexual activity: Yes     Partners: Male     Comment: Brown    Other Topics Concern    Not on file   Social History Narrative    ** Merged History Encounter **          Social Determinants of Health     Financial Resource Strain:     Difficulty of Paying Living Expenses:    Food Insecurity:     Worried About Running Out of Food in the Last Year:     Ran Out of Food in the Last Year:    Transportation Needs:     Lack of Transportation (Medical):      Lack of Transportation (Non-Medical):    Physical Activity:     Days of Exercise per Week:     Minutes of Exercise per Session:    Stress:     Feeling of Stress :    Social Connections:     Frequency of Communication with Friends and Family:     Frequency of Social Gatherings with Friends and Family:     Attends Adventism Services:     Active Member of Clubs or Organizations:     Attends Club or Organization Meetings:     Marital Status:    Intimate Partner Violence:     Fear of Current or Ex-Partner:     Emotionally Abused:     Physically Abused:     Sexually Abused:      Past Medical History:   Diagnosis Date    Anxiety     Migraine     Mood swings     Urinary incontinence     Varicella     as child      Family History   Problem Relation Age of Onset    Breast cancer Mother 61    Breast cancer Maternal Grandmother 79    Lung cancer Paternal Grandfather     Throat cancer Paternal Grandfather     Thyroid disease Father     No Known Problems Sister     Lung cancer Maternal Grandfather [de-identified]    Lung cancer Paternal Grandmother     Polycystic ovary syndrome Sister     No Known Problems Sister     Colon cancer Neg Hx     Ovarian cancer Neg Hx     Cervical cancer Neg Hx     Uterine cancer Neg Hx      Past Surgical History:   Procedure Laterality Date    CHOLECYSTECTOMY  2012    lap    KNEE SURGERY Left 2005    osteojchondroma     [place]  DELIVERY ONLY N/A 6/3/2020    Procedure:  SECTION (); Surgeon: Tristan Darling MD;  Location: [facility] ;  Service: Obstetrics       Current Outpatient Medications:     Diclofenac Sodium (VOLTAREN) 1 %, Apply 2 g topically 4 (four) times a day, Disp: 100 g, Rfl: 1    Allergies   Allergen Reactions    Other Sneezing     seasonal          Lab Review   No visits with results within 2 Month(s) from this visit     Latest known visit with results is:   Admission on 2020, Discharged on 2020   Component Date Value    ABO Grouping 2020 O     Rh Factor 2020 Positive     Antibody Screen 2020 Negative     Specimen Expiration Date 2020 [id number]     WBC 2020 18 09*    RBC 2020 3 87     Hemoglobin 2020 12 8     Hematocrit 2020 39 1     MCV 2020 101*    MCH 2020 33 1     MCHC 2020 32 7     RDW 2020 13 1     MPV 2020 9 9     Platelets  326     nRBC 2020 0     Neutrophils Relative 2020 79*    Immat GRANS % 2020 1     Lymphocytes Relative 2020 14     Monocytes Relative 2020 6     Eosinophils Relative 2020 0     Basophils Relative 2020 0     Neutrophils Absolute 2020 14 31*    Immature Grans Absolute 2020 0 15     Lymphocytes Absolute 06/03/2020 2 46     Monocytes Absolute 06/03/2020 1 06     Eosinophils Absolute 06/03/2020 0 07     Basophils Absolute 06/03/2020 0 04     RPR 06/03/2020 Non-Reactive     pH, Cord Hebert 06/03/2020 7 325     pCO2, Cord Hebert 06/03/2020 43 6*    pO2, Cord Hebert 06/03/2020 14 4*    HCO3, Cord Hebert 06/03/2020 22 2    [facility] Exc, Cord Hebert 06/03/2020 -3 8*    O2 Cont, Cord Hebert 06/03/2020 5 6     O2 HGB,VENOUS CORD 06/03/2020 25 6     pH, Cord Art 06/03/2020 7  283     pCO2, Cord Art 06/03/2020 49 7     pO2, Cord Art 06/03/2020 11 8     HCO3, Cord Art 06/03/2020 23 0     Base Exc, Cord Art 06/03/2020 -4 2*    O2 Content, Cord Art 06/03/2020 4 5     O2 Hgb, Arterial Cord 06/03/2020 20 5     PLACENTA IN STORAGE 06/03/2020 Placenta Discarded     WBC 06/03/2020 19 78*    RBC 06/03/2020 2 88*    Hemoglobin 06/03/2020 9 6*    Hematocrit 06/03/2020 28 9*    MCV 06/03/2020 100*    MCH 06/03/2020 33 3     MCHC 06/03/2020 33 2     RDW 06/03/2020 12 9     Platelets 11/43/4084 222     MPV 06/03/2020 9 7         Imaging: No results found  Objective:     Physical Exam  Vitals and nursing note reviewed  Constitutional:       General: She is not in acute distress  Appearance: She is well-developed  She is not diaphoretic  HENT:      Head: Normocephalic and atraumatic  Right Ear: Tympanic membrane, ear canal and external ear normal       Left Ear: Tympanic membrane, ear canal and external ear normal    Eyes:      General:         Right eye: No discharge  Left eye: No discharge  Conjunctiva/sclera: Conjunctivae normal       Pupils: Pupils are equal, round, and reactive to light  Neck:      Thyroid: No thyromegaly  Cardiovascular:      Rate and Rhythm: Normal rate and regular rhythm  Heart sounds: Normal heart sounds  No murmur heard  No friction rub  Pulmonary:      Effort: Pulmonary effort is normal  No respiratory distress  Breath sounds: Normal breath sounds   No wheezing or rales  Abdominal:      General: Bowel sounds are normal       Palpations: Abdomen is soft  Tenderness: There is no abdominal tenderness  Musculoskeletal:         General: No swelling, tenderness or deformity  Normal range of motion  Cervical back: Normal range of motion and neck supple  Comments: Pt points to the instep area at the medial aspect  Lymphadenopathy:      Cervical: No cervical adenopathy  Skin:     General: Skin is warm and dry  Findings: No rash  Neurological:      Mental Status: She is alert and oriented to person, place, and time  Cranial Nerves: No cranial nerve deficit  Psychiatric:         Behavior: Behavior normal          Thought Content: Thought content normal          Judgment: Judgment normal            Patient Instructions   Will get xrays but suspect they may be normal  Will refer to Dr Denney Flatten  Can try to use voltaren gel 2 inches to the painful area  Would prefer not to treat with gabapentin, Please got your covid shot  JASON Arreaga    Portions of the record may have been created with voice recognition software  Occasional wrong word or "sound a like" substitutions may have occurred due to the inherent limitations of voice recognition software  Read the chart carefully and recognize, using context, where substitutions have occurred

## 2021-08-16 ENCOUNTER — TELEPHONE (OUTPATIENT)
Dept: FAMILY MEDICINE CLINIC | Facility: CLINIC | Age: 36
End: 2021-08-16

## 2021-08-16 NOTE — TELEPHONE ENCOUNTER
----- Message from [address] [place] [address]  sent at 8/16/2021 10:04 AM EDT -----  Let Scotty Winter know the xray is wnl  Needs to make the appt with podiatry please

## 2021-08-19 NOTE — TELEPHONE ENCOUNTER
Sent patient his results through my chart because that is the best way to reach the patient when at work

## 2021-10-01 ENCOUNTER — TELEMEDICINE (OUTPATIENT)
Dept: FAMILY MEDICINE CLINIC | Facility: CLINIC | Age: 36
End: 2021-10-01
Payer: COMMERCIAL

## 2021-10-01 VITALS — TEMPERATURE: 100.5 F

## 2021-10-01 DIAGNOSIS — R50.9 FEVER, UNSPECIFIED FEVER CAUSE: ICD-10-CM

## 2021-10-01 DIAGNOSIS — J02.9 PHARYNGITIS, UNSPECIFIED ETIOLOGY: Primary | ICD-10-CM

## 2021-10-01 LAB — S PYO AG THROAT QL: POSITIVE

## 2021-10-01 PROCEDURE — 87880 STREP A ASSAY W/OPTIC: CPT | Performed by: FAMILY MEDICINE

## 2021-10-01 PROCEDURE — 99214 OFFICE O/P EST MOD 30 MIN: CPT | Performed by: FAMILY MEDICINE

## 2021-10-01 PROCEDURE — U0005 INFEC AGEN DETEC AMPLI PROBE: HCPCS | Performed by: FAMILY MEDICINE

## 2021-10-01 PROCEDURE — U0003 INFECTIOUS AGENT DETECTION BY NUCLEIC ACID (DNA OR RNA); SEVERE ACUTE RESPIRATORY SYNDROME CORONAVIRUS 2 (SARS-COV-2) (CORONAVIRUS DISEASE [COVID-19]), AMPLIFIED PROBE TECHNIQUE, MAKING USE OF HIGH THROUGHPUT TECHNOLOGIES AS DESCRIBED BY CMS-2020-01-R: HCPCS | Performed by: FAMILY MEDICINE

## 2021-10-01 RX ORDER — AMOXICILLIN 875 MG/1
875 TABLET, COATED ORAL 2 TIMES DAILY
Qty: 20 TABLET | Refills: 0 | Status: SHIPPED | OUTPATIENT
Start: 2021-10-01 | End: 2021-10-11

## 2021-10-02 LAB — SARS-COV-2 RNA RESP QL NAA+PROBE: NEGATIVE

## 2022-07-07 ENCOUNTER — OFFICE VISIT (OUTPATIENT)
Dept: FAMILY MEDICINE CLINIC | Facility: CLINIC | Age: 37
End: 2022-07-07
Payer: COMMERCIAL

## 2022-07-07 VITALS
HEIGHT: 64 IN | SYSTOLIC BLOOD PRESSURE: 110 MMHG | WEIGHT: 112.6 LBS | HEART RATE: 77 BPM | DIASTOLIC BLOOD PRESSURE: 72 MMHG | TEMPERATURE: 98.9 F | OXYGEN SATURATION: 97 % | BODY MASS INDEX: 19.22 KG/M2

## 2022-07-07 DIAGNOSIS — Z00.00 HEALTHCARE MAINTENANCE: ICD-10-CM
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
## 2022-07-07 DIAGNOSIS — Z13.6 SCREENING FOR HEART DISEASE: ICD-10-CM

## 2022-07-07 DIAGNOSIS — S30.861A TICK BITE OF ABDOMINAL WALL, INITIAL ENCOUNTER: Primary | ICD-10-CM

## 2022-07-07 PROBLEM — Z34.03 ENCOUNTER FOR SUPERVISION OF NORMAL FIRST PREGNANCY IN THIRD TRIMESTER: Status: RESOLVED | Noted: 2020-04-01 | Resolved: 2022-07-07

## 2022-07-07 PROCEDURE — 99213 OFFICE O/P EST LOW 20 MIN: CPT

## 2022-07-07 RX ORDER — DOXYCYCLINE HYCLATE 100 MG/1
100 CAPSULE ORAL EVERY 12 HOURS SCHEDULED
Qty: 42 CAPSULE | Refills: 0 | Status: SHIPPED | OUTPATIENT
Start: 2022-07-07 | End: 2022-07-08

## 2022-07-07 NOTE — PROGRESS NOTES
Assessment/Plan:         Problem List Items Addressed This Visit    None     Visit Diagnoses     Tick bite of abdominal wall, initial encounter    -  Primary    start doxycycline twice daily for 21 days and use steroid cream twice daily for 7 days  Call if you have any other symptoms or are not getting better  Relevant Medications    doxycycline hyclate (VIBRAMYCIN) 100 mg capsule    triamcinolone (KENALOG) 0 1 % ointment    Screening for heart disease        Relevant Orders    CBC and differential    Comprehensive metabolic panel    Lipid panel    TSH, 3rd generation with Free T4 reflex    Healthcare maintenance        Relevant Orders    CBC and differential    Comprehensive metabolic panel    Lipid panel    TSH, 3rd generation with Free T4 reflex            Subjective:      Patient ID: Johnnie Garcia is a 30 y o  female  Tick bite on Friday and site started getting red and puffy on   The following portions of the patient's history were reviewed and updated as appropriate:   Past Medical History:  She has a past medical history of Anxiety, Migraine, Mood swings, Urinary incontinence, and Varicella  ,  _______________________________________________________________________  Medical Problems:  does not have any pertinent problems on file ,  _______________________________________________________________________  Past Surgical History:   has a past surgical history that includes Knee surgery (Left, ); Cholecystectomy (); and pr  delivery only (N/A, 6/3/2020)  ,  _______________________________________________________________________  Family History:  family history includes Breast cancer (age of onset: 61) in her mother; Breast cancer (age of onset: 79) in her maternal grandmother; Lung cancer in her paternal grandfather and paternal grandmother; Lung cancer (age of onset: [de-identified]) in her maternal grandfather; No Known Problems in her sister and sister; Polycystic ovary syndrome in her sister; Throat cancer in her paternal grandfather; Thyroid disease in her father ,  _______________________________________________________________________  Social History:   reports that she has quit smoking  She started smoking about 23 years ago  She has never used smokeless tobacco  She reports previous alcohol use  She reports that she does not use drugs  ,  _______________________________________________________________________  Allergies:  is allergic to other     _______________________________________________________________________  Current Outpatient Medications   Medication Sig Dispense Refill    doxycycline hyclate (VIBRAMYCIN) 100 mg capsule Take 1 capsule (100 mg total) by mouth every 12 (twelve) hours for 21 days 42 capsule 0    triamcinolone (KENALOG) 0 1 % ointment Apply topically 2 (two) times a day 30 g 0     No current facility-administered medications for this visit      _______________________________________________________________________  Review of Systems   Constitutional: Positive for fatigue  Negative for chills, diaphoresis and fever  HENT: Negative for congestion, ear pain, sinus pressure, sinus pain and sore throat  Eyes: Negative for pain and visual disturbance  Respiratory: Negative for cough, shortness of breath and wheezing  Cardiovascular: Negative for chest pain and palpitations  Gastrointestinal: Negative for abdominal pain, constipation, diarrhea, nausea and vomiting  Genitourinary: Negative for dysuria, frequency, hematuria and urgency  Musculoskeletal: Negative for arthralgias, back pain and myalgias  Minor aches and pains   Skin: Positive for rash (around the umbilicus that is warm to touch and itchy)  Negative for color change  Neurological: Negative for dizziness, seizures, syncope and headaches  Psychiatric/Behavioral: Negative for agitation, dysphoric mood and sleep disturbance  All other systems reviewed and are negative  Objective:  Vitals:    07/07/22 1551   BP: 110/72   BP Location: Left arm   Patient Position: Sitting   Cuff Size: Standard   Pulse: 77   Temp: 98 9 °F (37 2 °C)   TempSrc: Tympanic   SpO2: 97%   Weight: 51 1 kg (112 lb 9 6 oz)   Height: 5' 4" (1 626 m)     Body mass index is 19 33 kg/m²  Physical Exam  Vitals and nursing note reviewed  Constitutional:       Appearance: Normal appearance  She is not ill-appearing  HENT:      Head: Normocephalic  Right Ear: Tympanic membrane, ear canal and external ear normal  There is no impacted cerumen  Left Ear: Tympanic membrane, ear canal and external ear normal  There is no impacted cerumen  Nose: Nose normal  No congestion  Mouth/Throat:      Mouth: Mucous membranes are moist       Pharynx: No posterior oropharyngeal erythema  Eyes:      Extraocular Movements: Extraocular movements intact  Cardiovascular:      Rate and Rhythm: Normal rate and regular rhythm  Heart sounds: Normal heart sounds  No murmur heard  Pulmonary:      Effort: Pulmonary effort is normal       Breath sounds: Normal breath sounds  No wheezing  Abdominal:      Palpations: Abdomen is soft  Tenderness: There is no abdominal tenderness  Musculoskeletal:         General: Normal range of motion  Cervical back: Normal range of motion  Right lower leg: No edema  Left lower leg: No edema  Lymphadenopathy:      Cervical: No cervical adenopathy  Skin:     General: Skin is warm and dry  Findings: Rash (periumbilicus, red and warm to touch and painful and itchy) present  Neurological:      General: No focal deficit present  Mental Status: She is alert     Psychiatric:         Mood and Affect: Mood normal          Behavior: Behavior normal

## 2022-07-08 ENCOUNTER — TELEPHONE (OUTPATIENT)
Dept: FAMILY MEDICINE CLINIC | Facility: CLINIC | Age: 37
End: 2022-07-08
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
## 2022-07-08 DIAGNOSIS — S30.861A TICK BITE OF ABDOMINAL WALL, INITIAL ENCOUNTER: Primary | ICD-10-CM

## 2022-07-08 RX ORDER — METHYLPREDNISOLONE 4 MG/1
TABLET ORAL
Qty: 21 EACH | Refills: 0 | Status: SHIPPED | OUTPATIENT
Start: 2022-07-08

## 2022-07-08 RX ORDER — AMOXICILLIN 500 MG/1
500 CAPSULE ORAL EVERY 8 HOURS SCHEDULED
Qty: 42 CAPSULE | Refills: 0 | Status: SHIPPED | OUTPATIENT
Start: 2022-07-08 | End: 2022-07-22

## 2022-07-08 NOTE — TELEPHONE ENCOUNTER
----- Message from Nubia Gonzalez sent at 7/8/2022  7:19 AM EDT -----  Regarding: FW: Tick Bite    ----- Message -----  From: Feliciano Gruber  Sent: 7/8/2022   5:24 AM EDT  To: , #  Subject: Tick Bite                                        Scarlett,    I woke up this morning to severe neck and back pain, which i figured was due to the tick bite  I also noticed that my upper lip is also very swollen  Is all this due to the tick bite? I only took 1 dose of the medication before any of these symptoms happened  I just wanted to make sure it wasnt due to the medication      Thanks,  Mo Tamez

## 2023-09-01 ENCOUNTER — VBI (OUTPATIENT)
Dept: ADMINISTRATIVE | Facility: OTHER | Age: 38
End: 2023-09-01

## 2023-09-01 NOTE — TELEPHONE ENCOUNTER
09/01/23 7:44 AM     [employer] SmartForm used to document caregap status.     [facility]

## 2023-10-26 NOTE — ASSESSMENT & PLAN NOTE
Call for home sleep study - [phone]   Will check HCG quantitative to further evaluate irregular menses

## 2024-12-10 NOTE — PROGRESS NOTES
Patient requesting refill of tizanidine and tramadol  I will allow refills of these medications as she has schedule an appointment to establish care with a spine and pain specialist, appointment scheduled for 01/03/2019 with Dr Joao Cormier  Additionally, she is requesting nicotine patches for smoking cessation  Nicotine 14 mg patch to be applied every 24 hours was prescribed 
Home

## (undated) DEVICE — SKIN MARKER DUAL TIP WITH RULER CAP, FLEXIBLE RULER AND LABELS: Brand: DEVON

## (undated) DEVICE — DRAIN SPONGES,6 PLY: Brand: EXCILON

## (undated) DEVICE — SUT VICRYL 0 CTX 36 IN J978H

## (undated) DEVICE — SWABSTCK, BENZOIN TINCTURE, 1/PK, STRL: Brand: APLICARE

## (undated) DEVICE — ABDOMINAL PAD: Brand: DERMACEA

## (undated) DEVICE — TELFA NON-ADHERENT ABSORBENT DRESSING: Brand: TELFA

## (undated) DEVICE — SUT PLAIN 2-0 CTX 27 IN 872H

## (undated) DEVICE — MEDI-VAC YANKAUER SUCTION HANDLE W/STRAIGHT TIP & CONTROL VENT: Brand: CARDINAL HEALTH

## (undated) DEVICE — PACK C-SECTION PBDS

## (undated) DEVICE — 3M™ STERI-STRIP™ REINFORCED ADHESIVE SKIN CLOSURES, R1547, 1/2 IN X 4 IN (12 MM X 100 MM), 6 STRIPS/ENVELOPE: Brand: 3M™ STERI-STRIP™

## (undated) DEVICE — SUT VICRYL 0 CT-1 36 IN J946H

## (undated) DEVICE — SUT MONOCRYL 4-0 PS-2 18 IN Y496G

## (undated) DEVICE — GAUZE SPONGES,16 PLY: Brand: CURITY

## (undated) DEVICE — GLOVE PI ULTRA TOUCH SZ.6.5

## (undated) DEVICE — GLOVE SRG BIOGEL ECLIPSE 7

## (undated) DEVICE — Device

## (undated) DEVICE — CHLORAPREP HI-LITE 26ML ORANGE